# Patient Record
Sex: MALE | Race: WHITE | NOT HISPANIC OR LATINO | Employment: FULL TIME | URBAN - METROPOLITAN AREA
[De-identification: names, ages, dates, MRNs, and addresses within clinical notes are randomized per-mention and may not be internally consistent; named-entity substitution may affect disease eponyms.]

---

## 2017-02-23 ENCOUNTER — TRANSCRIBE ORDERS (OUTPATIENT)
Dept: ADMINISTRATIVE | Facility: HOSPITAL | Age: 32
End: 2017-02-23

## 2017-02-23 DIAGNOSIS — R41.3 MEMORY IMPAIRMENT: Primary | ICD-10-CM

## 2021-04-29 ENCOUNTER — TRANSCRIBE ORDERS (OUTPATIENT)
Dept: ADMINISTRATIVE | Facility: HOSPITAL | Age: 36
End: 2021-04-29

## 2021-04-29 DIAGNOSIS — R10.32 LLQ ABDOMINAL PAIN: Primary | ICD-10-CM

## 2021-10-12 ENCOUNTER — LAB (OUTPATIENT)
Dept: LAB | Facility: HOSPITAL | Age: 36
End: 2021-10-12

## 2021-10-12 ENCOUNTER — OFFICE VISIT (OUTPATIENT)
Dept: INTERNAL MEDICINE | Facility: CLINIC | Age: 36
End: 2021-10-12

## 2021-10-12 ENCOUNTER — HOSPITAL ENCOUNTER (OUTPATIENT)
Dept: GENERAL RADIOLOGY | Facility: HOSPITAL | Age: 36
Discharge: HOME | End: 2021-10-12
Admitting: STUDENT IN AN ORGANIZED HEALTH CARE EDUCATION/TRAINING PROGRAM

## 2021-10-12 VITALS
DIASTOLIC BLOOD PRESSURE: 84 MMHG | WEIGHT: 261 LBS | OXYGEN SATURATION: 98 % | SYSTOLIC BLOOD PRESSURE: 116 MMHG | HEART RATE: 70 BPM | BODY MASS INDEX: 36.54 KG/M2 | HEIGHT: 71 IN | TEMPERATURE: 98.2 F

## 2021-10-12 DIAGNOSIS — F33.1 MDD (MAJOR DEPRESSIVE DISORDER), RECURRENT EPISODE, MODERATE (HCC): Primary | ICD-10-CM

## 2021-10-12 DIAGNOSIS — F41.1 GAD (GENERALIZED ANXIETY DISORDER): ICD-10-CM

## 2021-10-12 DIAGNOSIS — R06.02 SHORTNESS OF BREATH: ICD-10-CM

## 2021-10-12 DIAGNOSIS — Z13.220 LIPID SCREENING: ICD-10-CM

## 2021-10-12 DIAGNOSIS — Z11.59 NEED FOR HEPATITIS C SCREENING TEST: ICD-10-CM

## 2021-10-12 DIAGNOSIS — F33.1 MDD (MAJOR DEPRESSIVE DISORDER), RECURRENT EPISODE, MODERATE (HCC): ICD-10-CM

## 2021-10-12 PROBLEM — E66.9 OBESITY (BMI 30-39.9): Status: ACTIVE | Noted: 2021-10-12

## 2021-10-12 PROCEDURE — 71046 X-RAY EXAM CHEST 2 VIEWS: CPT

## 2021-10-12 PROCEDURE — 99204 OFFICE O/P NEW MOD 45 MIN: CPT | Performed by: STUDENT IN AN ORGANIZED HEALTH CARE EDUCATION/TRAINING PROGRAM

## 2021-10-12 RX ORDER — BUPROPION HYDROCHLORIDE 150 MG/1
150 TABLET ORAL DAILY
Qty: 30 TABLET | Refills: 1 | Status: SHIPPED | OUTPATIENT
Start: 2021-10-12 | End: 2021-11-11 | Stop reason: SDUPTHER

## 2021-10-12 NOTE — PROGRESS NOTES
Chief Complaint  Brett Glasgow is a 36 y.o. male presenting for Weight Gain ([facility] ), Shortness of Breath, and Cough.     Grew up in [place], [place]. Living in [place] since . Both his parents  early  of natural causes. Patient is . Has boy born . 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    Patient has a past medical history of MDD, TABATHA and obesity.    History of Present Illness  Patient is here to [facility].    He was diagnosed with anxiety and depression around .  He has tried multiple medications including Zoloft, BuSpar, Lexapro and Wellbutrin.  Possible others.  Most recent one was Zoloft, which she was not happy about.    He lost both his parents earlier this year about 2 months apart, I will both ill and had dementia.  Since this he has gained about 50 pounds.  Used to be around 175 pounds.  He feels he had himself go with everything has been going on.  Over the last month he has been struggling more at work, he is having difficulties talking, expressing himself during public speaking.  Feels like he loses his voice.  He also feels short of breath.  This appears to be related to performance situations.      The following portions of the patient's history were reviewed and updated as appropriate: allergies, current medications, past family history, past medical history, past social history, past surgical history and problem list.    PHQ-9 Depression Screening  Little interest or pleasure in doing things? 1   Feeling down, depressed, or hopeless? 1   Trouble falling or staying asleep, or sleeping too much? 1   Feeling tired or having little energy? 2   Poor appetite or overeating? 2   Feeling bad about yourself - or that you are a failure or have let yourself or your family down? 1   Trouble concentrating on things, such as reading the newspaper or watching television? 2   Moving or speaking so slowly that  "other people could have noticed? Or the opposite - being so fidgety or restless that you have been moving around a lot more than usual? 0   Thoughts that you would be better off dead, or of hurting yourself in some way? 0   PHQ-9 Total Score 10   If you checked off any problems, how difficult have these problems made it for you to do your work, take care of things at home, or get along with other people? Somewhat difficult         Objective  /84 (BP Location: Left arm, Patient Position: Sitting, Cuff Size: Large Adult)   Pulse 70   Temp 98.2 °F (36.8 °C) (Temporal)   Ht 179.1 cm (70.5\")   Wt 118 kg (261 lb)   SpO2 98%   BMI 36.92 kg/m²     Physical Exam  Vitals reviewed.   Constitutional:       Appearance: Normal appearance.   HENT:      Head: Normocephalic and atraumatic.      Nose: Nose normal. No congestion.      Mouth/Throat:      Mouth: Mucous membranes are moist.   Eyes:      Extraocular Movements: Extraocular movements intact.      Conjunctiva/sclera: Conjunctivae normal.   Cardiovascular:      Rate and Rhythm: Normal rate and regular rhythm.      Heart sounds: Normal heart sounds. No murmur heard.      Pulmonary:      Effort: Pulmonary effort is normal.      Breath sounds: Normal breath sounds.   Abdominal:      General: There is no distension.      Palpations: Abdomen is soft. There is no mass.      Tenderness: There is no abdominal tenderness.   Musculoskeletal:      Cervical back: Neck supple.      Right lower leg: No edema.      Left lower leg: No edema.   Skin:     General: Skin is warm and dry.   Neurological:      Mental Status: He is alert and oriented to person, place, and time. Mental status is at baseline.   Psychiatric:         Attention and Perception: Attention normal.         Mood and Affect: Mood is anxious and depressed.         Speech: Speech normal.         Behavior: Behavior normal.         Thought Content: Thought content normal.         Cognition and Memory: Cognition " normal.         Judgment: Judgment normal.         Assessment/Plan   1. MDD (major depressive disorder), recurrent episode, moderate (HCC)  2. TABATHA (generalized anxiety disorder)  Patient is currently moderately depressed.  I suspect his performance symptoms and is related to the anxiety, but we will do a separate work-up.  I have discussed this with the patient.  We will do a new trial of Wellbutrin after discussing with patient.  I have looked into other options like Effexor or and Trintellix, which are both not on formulary.  - Vitamin B12; Future  - Comprehensive Metabolic Panel; Future  - CBC (No Diff); Future  - buPROPion XL (Wellbutrin XL) 150 MG 24 hr tablet; Take 1 tablet by mouth Daily.  Dispense: 30 tablet; Refill: 1    3. Shortness of breath  Could be anxiety versus underlying lung disease.  Review chest x-ray and reevaluate.    - XR Chest PA & Lateral; Future    4. Lipid screening  Patient will return for fasting lipids.  - Lipid Panel; Future    5. Need for hepatitis C screening test  We will do screening as recommended.  - Hepatitis C Antibody; Future      Return in about 1 month (around 11/12/2021) for Annual physical.    Future Appointments       Provider Department Center    11/11/2021 9:15 AM Fito Feliz MD [facility] INTERNAL MEDICINE [place]          Fito Feliz MD  Family Medicine  10/12/2021    Note: Speech recognition transcription software may have been used to create portions of this document.  An attempt at proofreading has been made but errors in transcription could still be present.

## 2021-10-18 ENCOUNTER — LAB (OUTPATIENT)
Dept: LAB | Facility: HOSPITAL | Age: 36
End: 2021-10-18

## 2021-10-18 DIAGNOSIS — Z13.220 LIPID SCREENING: ICD-10-CM

## 2021-10-18 DIAGNOSIS — F33.1 MDD (MAJOR DEPRESSIVE DISORDER), RECURRENT EPISODE, MODERATE (HCC): ICD-10-CM

## 2021-10-18 DIAGNOSIS — F41.1 GAD (GENERALIZED ANXIETY DISORDER): ICD-10-CM

## 2021-10-18 DIAGNOSIS — Z11.59 NEED FOR HEPATITIS C SCREENING TEST: ICD-10-CM

## 2021-10-18 LAB
ALBUMIN SERPL-MCNC: 4.9 G/DL (ref 3.5–5.2)
ALBUMIN/GLOB SERPL: 1.8 G/DL
ALP SERPL-CCNC: 76 U/L (ref 39–117)
ALT SERPL W P-5'-P-CCNC: 25 U/L (ref 1–41)
ANION GAP SERPL CALCULATED.3IONS-SCNC: 15.1 MMOL/L (ref 5–15)
AST SERPL-CCNC: 17 U/L (ref 1–40)
BILIRUB SERPL-MCNC: 0.5 MG/DL (ref 0–1.2)
BUN SERPL-MCNC: 14 MG/DL (ref 6–20)
BUN/CREAT SERPL: 13.3 (ref 7–25)
CALCIUM SPEC-SCNC: 9.2 MG/DL (ref 8.6–10.5)
CHLORIDE SERPL-SCNC: 102 MMOL/L (ref 98–107)
CHOLEST SERPL-MCNC: 286 MG/DL (ref 0–200)
CO2 SERPL-SCNC: 22.9 MMOL/L (ref 22–29)
CREAT SERPL-MCNC: 1.05 MG/DL (ref 0.76–1.27)
DEPRECATED RDW RBC AUTO: 44 FL (ref 37–54)
ERYTHROCYTE [DISTWIDTH] IN BLOOD BY AUTOMATED COUNT: 13.3 % (ref 12.3–15.4)
GFR SERPL CREATININE-BSD FRML MDRD: 79 ML/MIN/1.73
GLOBULIN UR ELPH-MCNC: 2.8 GM/DL
GLUCOSE SERPL-MCNC: 92 MG/DL (ref 65–99)
HCT VFR BLD AUTO: 45.2 % (ref 37.5–51)
HCV AB SER DONR QL: NORMAL
HDLC SERPL-MCNC: 49 MG/DL (ref 40–60)
HGB BLD-MCNC: 15.2 G/DL (ref 13–17.7)
LDLC SERPL CALC-MCNC: 208 MG/DL (ref 0–100)
LDLC/HDLC SERPL: 4.2 {RATIO}
MCH RBC QN AUTO: 30.3 PG (ref 26.6–33)
MCHC RBC AUTO-ENTMCNC: 33.6 G/DL (ref 31.5–35.7)
MCV RBC AUTO: 90.2 FL (ref 79–97)
PLATELET # BLD AUTO: 290 10*3/MM3 (ref 140–450)
PMV BLD AUTO: 10.1 FL (ref 6–12)
POTASSIUM SERPL-SCNC: 4.6 MMOL/L (ref 3.5–5.2)
PROT SERPL-MCNC: 7.7 G/DL (ref 6–8.5)
RBC # BLD AUTO: 5.01 10*6/MM3 (ref 4.14–5.8)
SODIUM SERPL-SCNC: 140 MMOL/L (ref 136–145)
TRIGL SERPL-MCNC: 156 MG/DL (ref 0–150)
TSH SERPL DL<=0.05 MIU/L-ACNC: 2.04 UIU/ML (ref 0.27–4.2)
VIT B12 BLD-MCNC: 420 PG/ML (ref 211–946)
VLDLC SERPL-MCNC: 29 MG/DL (ref 5–40)
WBC # BLD AUTO: 5.56 10*3/MM3 (ref 3.4–10.8)

## 2021-10-18 PROCEDURE — 80053 COMPREHEN METABOLIC PANEL: CPT

## 2021-10-18 PROCEDURE — 80061 LIPID PANEL: CPT

## 2021-10-18 PROCEDURE — 84443 ASSAY THYROID STIM HORMONE: CPT

## 2021-10-18 PROCEDURE — 85027 COMPLETE CBC AUTOMATED: CPT

## 2021-10-18 PROCEDURE — 86803 HEPATITIS C AB TEST: CPT

## 2021-10-18 PROCEDURE — 82607 VITAMIN B-12: CPT

## 2021-10-18 PROCEDURE — 36415 COLL VENOUS BLD VENIPUNCTURE: CPT

## 2021-10-20 ENCOUNTER — PATIENT ROUNDING (BHMG ONLY) (OUTPATIENT)
Dept: INTERNAL MEDICINE | Facility: CLINIC | Age: 36
End: 2021-10-20

## 2021-10-20 NOTE — PROGRESS NOTES
My name is Xochitl Sesay & I’m the practice manager here at [facility] with Dr Feliz.      I’m calling as I see you were a new patient with our practice last week & I wanted to officially welcome you & make sure everything went smoothly for you.  Do you have a few minutes to talk? Yes    Mr Glasgow stated that he was very impressed & was excited about being a new patient.    How were your staff interactions?  States they were very good and the Dr was very thorough.    Overall very positive experience.    I again welcomed him, thanked him for his input  & told him we look forward to seeing him back in a few weeks.

## 2021-10-25 PROBLEM — E78.00 HYPERCHOLESTEROLEMIA WITH LDL GREATER THAN 190 MG/DL: Status: ACTIVE | Noted: 2021-10-25

## 2021-11-11 ENCOUNTER — OFFICE VISIT (OUTPATIENT)
Dept: INTERNAL MEDICINE | Facility: CLINIC | Age: 36
End: 2021-11-11

## 2021-11-11 VITALS
OXYGEN SATURATION: 99 % | BODY MASS INDEX: 35.76 KG/M2 | HEIGHT: 71 IN | DIASTOLIC BLOOD PRESSURE: 84 MMHG | HEART RATE: 75 BPM | WEIGHT: 255.4 LBS | SYSTOLIC BLOOD PRESSURE: 120 MMHG | TEMPERATURE: 98.4 F

## 2021-11-11 DIAGNOSIS — Z00.00 WELL ADULT EXAM: Primary | ICD-10-CM

## 2021-11-11 DIAGNOSIS — R21 SKIN RASH: ICD-10-CM

## 2021-11-11 DIAGNOSIS — E78.00 HYPERCHOLESTEROLEMIA WITH LDL GREATER THAN 190 MG/DL: ICD-10-CM

## 2021-11-11 DIAGNOSIS — R49.9 VOICE DISTURBANCE: ICD-10-CM

## 2021-11-11 DIAGNOSIS — F41.1 GAD (GENERALIZED ANXIETY DISORDER): ICD-10-CM

## 2021-11-11 DIAGNOSIS — Z23 NEED FOR TDAP VACCINATION: ICD-10-CM

## 2021-11-11 DIAGNOSIS — F33.1 MDD (MAJOR DEPRESSIVE DISORDER), RECURRENT EPISODE, MODERATE (HCC): ICD-10-CM

## 2021-11-11 DIAGNOSIS — E66.01 SEVERE OBESITY WITH BODY MASS INDEX (BMI) OF 35.0 TO 39.9 WITH COMORBIDITY (HCC): ICD-10-CM

## 2021-11-11 PROCEDURE — 90715 TDAP VACCINE 7 YRS/> IM: CPT | Performed by: STUDENT IN AN ORGANIZED HEALTH CARE EDUCATION/TRAINING PROGRAM

## 2021-11-11 PROCEDURE — 99395 PREV VISIT EST AGE 18-39: CPT | Performed by: STUDENT IN AN ORGANIZED HEALTH CARE EDUCATION/TRAINING PROGRAM

## 2021-11-11 PROCEDURE — 90471 IMMUNIZATION ADMIN: CPT | Performed by: STUDENT IN AN ORGANIZED HEALTH CARE EDUCATION/TRAINING PROGRAM

## 2021-11-11 PROCEDURE — 99214 OFFICE O/P EST MOD 30 MIN: CPT | Performed by: STUDENT IN AN ORGANIZED HEALTH CARE EDUCATION/TRAINING PROGRAM

## 2021-11-11 RX ORDER — CLOTRIMAZOLE AND BETAMETHASONE DIPROPIONATE 10; .64 MG/G; MG/G
1 CREAM TOPICAL 2 TIMES DAILY
Qty: 15 G | Refills: 1 | Status: SHIPPED | OUTPATIENT
Start: 2021-11-11 | End: 2021-12-02

## 2021-11-11 RX ORDER — BUPROPION HYDROCHLORIDE 300 MG/1
300 TABLET ORAL DAILY
Qty: 30 TABLET | Refills: 4 | Status: SHIPPED | OUTPATIENT
Start: 2021-11-11 | End: 2022-02-15 | Stop reason: SDUPTHER

## 2021-11-11 RX ORDER — ROSUVASTATIN CALCIUM 20 MG/1
20 TABLET, COATED ORAL DAILY
Qty: 30 TABLET | Refills: 4 | Status: SHIPPED | OUTPATIENT
Start: 2021-11-11 | End: 2022-01-05 | Stop reason: SINTOL

## 2021-11-11 NOTE — PATIENT INSTRUCTIONS
Dyslipidemia  Dyslipidemia is an imbalance of waxy, fat-like substances (lipids) in the blood. The body needs lipids in small amounts. Dyslipidemia often involves a high level of cholesterol or triglycerides, which are types of lipids.  Common forms of dyslipidemia include:  · High levels of LDL cholesterol. LDL is the type of cholesterol that causes fatty deposits (plaques) to build up in the blood vessels that carry blood away from your heart (arteries).  · Low levels of HDL cholesterol. HDL cholesterol is the type of cholesterol that protects against heart disease. High levels of HDL remove the LDL buildup from arteries.  · High levels of triglycerides. Triglycerides are a fatty substance in the blood that is linked to a buildup of plaques in the arteries.  What are the causes?  Primary dyslipidemia is caused by changes (mutations) in genes that are passed down through families (inherited). These mutations cause several types of dyslipidemia.  Secondary dyslipidemia is caused by lifestyle choices and diseases that lead to dyslipidemia, such as:  · Eating a diet that is high in animal fat.  · Not getting enough exercise.  · Having diabetes, kidney disease, liver disease, or thyroid disease.  · Drinking large amounts of alcohol.  · Using certain medicines.  What increases the risk?  You are more likely to develop this condition if you are an older man or if you are a woman who has gone through menopause. Other risk factors include:  · Having a family history of dyslipidemia.  · Taking certain medicines, including birth control pills, steroids, some diuretics, and beta-blockers.  · Smoking cigarettes.  · Eating a high-fat diet.  · Having certain medical conditions such as diabetes, polycystic ovary syndrome (PCOS), kidney disease, liver disease, or hypothyroidism.  · Not exercising regularly.  · Being overweight or obese with too much belly fat.  What are the signs or symptoms?  In most cases, dyslipidemia does not  usually cause any symptoms.  In severe cases, very high lipid levels can cause:  · Fatty bumps under the skin (xanthomas).  · White or gray ring around the black center (pupil) of the eye.  Very high triglyceride levels can cause inflammation of the pancreas (pancreatitis).  How is this diagnosed?  Your health care provider may diagnose dyslipidemia based on a routine blood test (fasting blood test). Because most people do not have symptoms of the condition, this blood testing (lipid profile) is done on adults age 20 and older and is repeated every 5 years. This test checks:  · Total cholesterol. This measures the total amount of cholesterol in your blood, including LDL cholesterol, HDL cholesterol, and triglycerides. A healthy number is below 200.  · LDL cholesterol. The target number for LDL cholesterol is different for each person, depending on individual risk factors. Ask your health care provider what your LDL cholesterol should be.  · HDL cholesterol. An HDL level of 60 or higher is best because it helps to protect against heart disease. A number below 40 for men or below 50 for women increases the risk for heart disease.  · Triglycerides. A healthy triglyceride number is below 150.  If your lipid profile is abnormal, your health care provider may do other blood tests.  How is this treated?  Treatment depends on the type of dyslipidemia that you have and your other risk factors for heart disease and stroke. Your health care provider will have a target range for your lipid levels based on this information.  For many people, this condition may be treated by lifestyle changes, such as diet and exercise. Your health care provider may recommend that you:  · Get regular exercise.  · Make changes to your diet.  · Quit smoking if you smoke.  If diet changes and exercise do not help you reach your goals, your health care provider may also prescribe medicine to lower lipids. The most commonly prescribed type of medicine  lowers your LDL cholesterol (statin drug). If you have a high triglyceride level, your provider may prescribe another type of drug (fibrate) or an omega-3 fish oil supplement, or both.  Follow these instructions at home:    Eating and drinking  · Follow instructions from your health care provider or dietitian about eating or drinking restrictions.  · Eat a healthy diet as told by your health care provider. This can help you reach and maintain a healthy weight, lower your LDL cholesterol, and raise your HDL cholesterol. This may include:  ? Limiting your calories, if you are overweight.  ? Eating more fruits, vegetables, whole grains, fish, and lean meats.  ? Limiting saturated fat, trans fat, and cholesterol.  · If you drink alcohol:  ? Limit how much you use.  ? Be aware of how much alcohol is in your drink. In the [place] one drink equals one 12 oz bottle of beer (355 mL), one 5 oz glass of wine (148 mL), or one 1½ oz glass of hard liquor (44 mL).  · Do not drink alcohol if:  ? Your health care provider tells you not to drink.  ? You are pregnant, may be pregnant, or are planning to become pregnant.  Activity  · Get regular exercise. Start an exercise and strength training program as told by your health care provider. Ask your health care provider what activities are safe for you. Your health care provider may recommend:  ? 30 minutes of aerobic activity 4-6 days a week. Brisk walking is an example of aerobic activity.  ? Strength training 2 days a week.  General instructions  · Do not use any products that contain nicotine or tobacco, such as cigarettes, e-cigarettes, and chewing tobacco. If you need help quitting, ask your health care provider.  · Take over-the-counter and prescription medicines only as told by your health care provider. This includes supplements.  · Keep all follow-up visits as told by your health care provider.  Contact a health care provider if:  · You are:  ? Having trouble sticking to your  exercise or diet plan.  ? Struggling to quit smoking or control your use of alcohol.  Summary  · Dyslipidemia often involves a high level of cholesterol or triglycerides, which are types of lipids.  · Treatment depends on the type of dyslipidemia that you have and your other risk factors for heart disease and stroke.  · For many people, treatment starts with lifestyle changes, such as diet and exercise.  · Your health care provider may prescribe medicine to lower lipids.  This information is not intended to replace advice given to you by your health care provider. Make sure you discuss any questions you have with your health care provider.  Document Revised: 08/12/2019 Document Reviewed: 07/19/2019  [email or web address] Patient Education © 2021 [employer]

## 2021-11-11 NOTE — PROGRESS NOTES
Chief Complaint  Brett Glasgow is a 36 y.o. male presenting for Annual Exam (fasting), Hyperlipidemia, and Rash (neck  X's about 3 weeks).     Grew up in [place], [place]. Living in [place] since . Both his parents  early  of natural causes. Patient is . Has boy born . 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     Patient has a past medical history of MDD, TABATHA, hyperlipidemia () and obesity.    History of Present Illness  Patient is here for annual physical and follow-up.    Over the last 2 to 3 weeks he has noticed a rash of his left anterior neck, that is mildly elevated and itchy.  He is not aware that he has applied anything or warm anything to cause this.  No rash anywhere else on his body, nothing on the right side.    Patient also is still reporting difficulties expressing himself, feels that the voice count of cracks or turns hoarse.  We did x-ray of his chest that did not show anything wrong.  He would like to see ENT for evaluation, potentially speech therapist later on if needed.    Patient continues to follow-up with mental health.  He is happy with the Wellbutrin, but they have discussed may be to increase the dose from 150 to 300 mg.    Patient is also concerned about his cholesterol levels.  His father was on cholesterol lowering medication.  Patient has made some lifestyle changes and is working on weight loss.  He is amenable to starting cholesterol-lowering medication.    The following portions of the patient's history were reviewed and updated as appropriate: allergies, current medications, past family history, past medical history, past social history, past surgical history and problem list.    Image captured per patient verbal consent:          Objective  /84 (BP Location: Left arm, Patient Position: Sitting, Cuff Size: Large Adult)   Pulse 75   Temp 98.4 °F (36.9 °C) (Temporal)   Ht 179.1 cm  "(70.51\")   Wt 116 kg (255 lb 6.4 oz)   SpO2 99%   BMI 36.12 kg/m²     Physical Exam  Vitals reviewed.   Constitutional:       Appearance: Normal appearance. He is obese.   HENT:      Head: Normocephalic and atraumatic.      Nose: No congestion.   Eyes:      Extraocular Movements: Extraocular movements intact.      Conjunctiva/sclera: Conjunctivae normal.   Cardiovascular:      Rate and Rhythm: Normal rate and regular rhythm.      Heart sounds: Normal heart sounds. No murmur heard.      Pulmonary:      Effort: Pulmonary effort is normal.      Breath sounds: Normal breath sounds.   Abdominal:      General: There is no distension.      Palpations: Abdomen is soft. There is no mass.      Tenderness: There is no abdominal tenderness.   Musculoskeletal:      Cervical back: Neck supple.      Right lower leg: No edema.      Left lower leg: No edema.   Skin:     General: Skin is warm and dry.   Neurological:      Mental Status: He is alert and oriented to person, place, and time. Mental status is at baseline.   Psychiatric:         Behavior: Behavior normal.         Thought Content: Thought content normal.         Assessment/Plan   1. Well adult exam  Counseled on recommendation for Tdap vaccine every 10 years, patient would like to receive today.    2. Hypercholesterolemia with LDL greater than 190 mg/dL  Patient meets criteria for high intensity statin.  Discussed treating with atorvastatin versus rosuvastatin.  Patient would like to try rosuvastatin, will start with 20 mg.  Counseled on side effects including elevated liver enzymes, upper abdominal discomfort, GI side effects, myalgias and hypersensitivity reactions.  We will recheck this level in about 3 months with follow-up.  - rosuvastatin (Crestor) 20 MG tablet; Take 1 tablet by mouth Daily.  Dispense: 30 tablet; Refill: 4  - Lipid Panel; Future  - Hepatic Function Panel; Future  - CK; Future    3. TABATHA (generalized anxiety disorder)  4. MDD (major depressive " disorder), recurrent episode, moderate (HCC)  Improving.  We will increase bupropion from 150 to 300 mg as requested.  - buPROPion XL (Wellbutrin XL) 300 MG 24 hr tablet; Take 1 tablet by mouth Daily.  Dispense: 30 tablet; Refill: 4    5. Skin rash  Unclear cause.  We will treat as possible yeast versus dermatitis, if this is not resolve or gets worse I would consider referral to dermatology.  - clotrimazole-betamethasone (Lotrisone) 1-0.05 % cream; Apply 1 application topically to the appropriate area as directed 2 (Two) Times a Day for 21 days.  Dispense: 15 g; Refill: 1    6. Voice disturbance  We will refer her to ENT as next step.  Depending on outcome he might consider referral to reach therapist.  - Ambulatory Referral to ENT (Otolaryngology)    7. Severe obesity with body mass index (BMI) of 35.0 to 39.9 with comorbidity (HCC)  Patient's Body mass index is 36.12 kg/m². indicating that he is morbidly obese (BMI > 40 or > 35 with obesity - related health condition). Obesity-related health conditions include the following: dyslipidemias. Obesity is improving with lifestyle modifications. BMI is is above average; BMI management plan is completed. We discussed portion control and increasing exercise..      8. Need for Tdap vaccination  Administered today.  - Tdap Vaccine Greater Than or Equal To 8yo IM    Return in about 3 months (around 2/11/2022) for Recheck.    Future Appointments       Provider Department Center    2/15/2022 9:15 AM Fito Feliz MD [facility] INTER[facility] MEDICINE [place]            Fito Feliz MD  Family Medicine  11/11/2021

## 2021-12-30 ENCOUNTER — HOSPITAL ENCOUNTER (EMERGENCY)
Facility: HOSPITAL | Age: 36
Discharge: HOME | End: 2021-12-30
Attending: EMERGENCY MEDICINE | Admitting: EMERGENCY MEDICINE

## 2021-12-30 ENCOUNTER — APPOINTMENT (OUTPATIENT)
Dept: CT IMAGING | Facility: HOSPITAL | Age: 36
End: 2021-12-30

## 2021-12-30 VITALS
HEIGHT: 70 IN | SYSTOLIC BLOOD PRESSURE: 129 MMHG | TEMPERATURE: 98.7 F | HEART RATE: 75 BPM | WEIGHT: 250 LBS | OXYGEN SATURATION: 99 % | RESPIRATION RATE: 18 BRPM | DIASTOLIC BLOOD PRESSURE: 88 MMHG | BODY MASS INDEX: 35.79 KG/M2

## 2021-12-30 DIAGNOSIS — K76.0 HEPATIC STEATOSIS: ICD-10-CM

## 2021-12-30 DIAGNOSIS — R10.32 LEFT LOWER QUADRANT ABDOMINAL PAIN: Primary | ICD-10-CM

## 2021-12-30 LAB
ALBUMIN SERPL-MCNC: 4.8 G/DL (ref 3.5–5.2)
ALBUMIN/GLOB SERPL: 1.5 G/DL
ALP SERPL-CCNC: 96 U/L (ref 39–117)
ALT SERPL W P-5'-P-CCNC: 52 U/L (ref 1–41)
ANION GAP SERPL CALCULATED.3IONS-SCNC: 13 MMOL/L (ref 5–15)
AST SERPL-CCNC: 26 U/L (ref 1–40)
BASOPHILS # BLD AUTO: 0.06 10*3/MM3 (ref 0–0.2)
BASOPHILS NFR BLD AUTO: 0.8 % (ref 0–1.5)
BILIRUB SERPL-MCNC: 0.6 MG/DL (ref 0–1.2)
BILIRUB UR QL STRIP: NEGATIVE
BUN SERPL-MCNC: 13 MG/DL (ref 6–20)
BUN/CREAT SERPL: 10.7 (ref 7–25)
CALCIUM SPEC-SCNC: 9.6 MG/DL (ref 8.6–10.5)
CHLORIDE SERPL-SCNC: 100 MMOL/L (ref 98–107)
CLARITY UR: CLEAR
CO2 SERPL-SCNC: 25 MMOL/L (ref 22–29)
COLOR UR: YELLOW
CREAT SERPL-MCNC: 1.22 MG/DL (ref 0.76–1.27)
DEPRECATED RDW RBC AUTO: 42.8 FL (ref 37–54)
EOSINOPHIL # BLD AUTO: 0.09 10*3/MM3 (ref 0–0.4)
EOSINOPHIL NFR BLD AUTO: 1.2 % (ref 0.3–6.2)
ERYTHROCYTE [DISTWIDTH] IN BLOOD BY AUTOMATED COUNT: 12.9 % (ref 12.3–15.4)
GFR SERPL CREATININE-BSD FRML MDRD: 66 ML/MIN/1.73
GLOBULIN UR ELPH-MCNC: 3.1 GM/DL
GLUCOSE SERPL-MCNC: 86 MG/DL (ref 65–99)
GLUCOSE UR STRIP-MCNC: NEGATIVE MG/DL
HCT VFR BLD AUTO: 47.4 % (ref 37.5–51)
HGB BLD-MCNC: 15.7 G/DL (ref 13–17.7)
HGB UR QL STRIP.AUTO: NEGATIVE
HOLD SPECIMEN: NORMAL
IMM GRANULOCYTES # BLD AUTO: 0.03 10*3/MM3 (ref 0–0.05)
IMM GRANULOCYTES NFR BLD AUTO: 0.4 % (ref 0–0.5)
KETONES UR QL STRIP: NEGATIVE
LEUKOCYTE ESTERASE UR QL STRIP.AUTO: NEGATIVE
LIPASE SERPL-CCNC: 19 U/L (ref 13–60)
LYMPHOCYTES # BLD AUTO: 1.67 10*3/MM3 (ref 0.7–3.1)
LYMPHOCYTES NFR BLD AUTO: 22 % (ref 19.6–45.3)
MCH RBC QN AUTO: 30.4 PG (ref 26.6–33)
MCHC RBC AUTO-ENTMCNC: 33.1 G/DL (ref 31.5–35.7)
MCV RBC AUTO: 91.7 FL (ref 79–97)
MONOCYTES # BLD AUTO: 0.5 10*3/MM3 (ref 0.1–0.9)
MONOCYTES NFR BLD AUTO: 6.6 % (ref 5–12)
NEUTROPHILS NFR BLD AUTO: 5.24 10*3/MM3 (ref 1.7–7)
NEUTROPHILS NFR BLD AUTO: 69 % (ref 42.7–76)
NITRITE UR QL STRIP: NEGATIVE
NRBC BLD AUTO-RTO: 0 /100 WBC (ref 0–0.2)
PH UR STRIP.AUTO: 6 [PH] (ref 5–8)
PLATELET # BLD AUTO: 261 10*3/MM3 (ref 140–450)
PMV BLD AUTO: 9.3 FL (ref 6–12)
POTASSIUM SERPL-SCNC: 4.2 MMOL/L (ref 3.5–5.2)
PROT SERPL-MCNC: 7.9 G/DL (ref 6–8.5)
PROT UR QL STRIP: NEGATIVE
RBC # BLD AUTO: 5.17 10*6/MM3 (ref 4.14–5.8)
SODIUM SERPL-SCNC: 138 MMOL/L (ref 136–145)
SP GR UR STRIP: 1.01 (ref 1–1.03)
UROBILINOGEN UR QL STRIP: NORMAL
WBC NRBC COR # BLD: 7.59 10*3/MM3 (ref 3.4–10.8)
WHOLE BLOOD HOLD SPECIMEN: NORMAL
WHOLE BLOOD HOLD SPECIMEN: NORMAL

## 2021-12-30 PROCEDURE — 25010000002 IOPAMIDOL 61 % SOLUTION: Performed by: EMERGENCY MEDICINE

## 2021-12-30 PROCEDURE — 74177 CT ABD & PELVIS W/CONTRAST: CPT

## 2021-12-30 PROCEDURE — 36415 COLL VENOUS BLD VENIPUNCTURE: CPT

## 2021-12-30 PROCEDURE — 80053 COMPREHEN METABOLIC PANEL: CPT

## 2021-12-30 PROCEDURE — 96374 THER/PROPH/DIAG INJ IV PUSH: CPT

## 2021-12-30 PROCEDURE — 99283 EMERGENCY DEPT VISIT LOW MDM: CPT

## 2021-12-30 PROCEDURE — 81003 URINALYSIS AUTO W/O SCOPE: CPT

## 2021-12-30 PROCEDURE — 85025 COMPLETE CBC W/AUTO DIFF WBC: CPT

## 2021-12-30 PROCEDURE — 25010000002 MORPHINE PER 10 MG: Performed by: EMERGENCY MEDICINE

## 2021-12-30 PROCEDURE — 83690 ASSAY OF LIPASE: CPT

## 2021-12-30 PROCEDURE — 25010000002 ONDANSETRON PER 1 MG: Performed by: NURSE PRACTITIONER

## 2021-12-30 PROCEDURE — 96375 TX/PRO/DX INJ NEW DRUG ADDON: CPT

## 2021-12-30 RX ORDER — ONDANSETRON 2 MG/ML
4 INJECTION INTRAMUSCULAR; INTRAVENOUS ONCE
Status: COMPLETED | OUTPATIENT
Start: 2021-12-30 | End: 2021-12-30

## 2021-12-30 RX ORDER — SODIUM CHLORIDE 0.9 % (FLUSH) 0.9 %
10 SYRINGE (ML) INJECTION AS NEEDED
Status: DISCONTINUED | OUTPATIENT
Start: 2021-12-30 | End: 2021-12-31 | Stop reason: HOSPADM

## 2021-12-30 RX ORDER — ONDANSETRON 4 MG/1
4 TABLET, ORALLY DISINTEGRATING ORAL 4 TIMES DAILY PRN
Qty: 16 TABLET | Refills: 0 | Status: SHIPPED | OUTPATIENT
Start: 2021-12-30 | End: 2022-04-19

## 2021-12-30 RX ORDER — DICYCLOMINE HCL 20 MG
20 TABLET ORAL EVERY 6 HOURS
Qty: 24 TABLET | Refills: 0 | Status: SHIPPED | OUTPATIENT
Start: 2021-12-30 | End: 2022-01-05 | Stop reason: HOSPADM

## 2021-12-30 RX ORDER — MORPHINE SULFATE 4 MG/ML
4 INJECTION, SOLUTION INTRAMUSCULAR; INTRAVENOUS ONCE
Status: COMPLETED | OUTPATIENT
Start: 2021-12-30 | End: 2021-12-30

## 2021-12-30 RX ORDER — SODIUM CHLORIDE 9 MG/ML
10 INJECTION INTRAVENOUS AS NEEDED
Status: DISCONTINUED | OUTPATIENT
Start: 2021-12-30 | End: 2021-12-31 | Stop reason: HOSPADM

## 2021-12-30 RX ADMIN — IOPAMIDOL 100 ML: 612 INJECTION, SOLUTION INTRAVENOUS at 22:00

## 2021-12-30 RX ADMIN — ONDANSETRON 4 MG: 2 INJECTION INTRAMUSCULAR; INTRAVENOUS at 21:40

## 2021-12-30 RX ADMIN — MORPHINE SULFATE 4 MG: 4 INJECTION, SOLUTION INTRAMUSCULAR; INTRAVENOUS at 21:42

## 2021-12-30 RX ADMIN — SODIUM CHLORIDE 1000 ML: 9 INJECTION, SOLUTION INTRAVENOUS at 21:40

## 2022-01-05 ENCOUNTER — OFFICE VISIT (OUTPATIENT)
Dept: INTERNAL MEDICINE | Facility: CLINIC | Age: 37
End: 2022-01-05

## 2022-01-05 VITALS
DIASTOLIC BLOOD PRESSURE: 80 MMHG | BODY MASS INDEX: 37.16 KG/M2 | WEIGHT: 259.6 LBS | HEIGHT: 70 IN | TEMPERATURE: 98.2 F | OXYGEN SATURATION: 97 % | SYSTOLIC BLOOD PRESSURE: 124 MMHG | HEART RATE: 71 BPM

## 2022-01-05 DIAGNOSIS — R10.9 ABDOMINAL PAIN, UNSPECIFIED ABDOMINAL LOCATION: Primary | ICD-10-CM

## 2022-01-05 DIAGNOSIS — E78.00 HYPERCHOLESTEROLEMIA WITH LDL GREATER THAN 190 MG/DL: ICD-10-CM

## 2022-01-05 DIAGNOSIS — R74.01 ELEVATED ALANINE AMINOTRANSFERASE (ALT) LEVEL: ICD-10-CM

## 2022-01-05 PROCEDURE — 99215 OFFICE O/P EST HI 40 MIN: CPT | Performed by: STUDENT IN AN ORGANIZED HEALTH CARE EDUCATION/TRAINING PROGRAM

## 2022-01-05 RX ORDER — ATORVASTATIN CALCIUM 10 MG/1
10 TABLET, FILM COATED ORAL DAILY
Qty: 30 TABLET | Refills: 2 | Status: SHIPPED | OUTPATIENT
Start: 2022-01-05 | End: 2022-02-15 | Stop reason: SDUPTHER

## 2022-01-05 NOTE — PROGRESS NOTES
"Chief Complaint  Brett Glasgow is a 36 y.o. male presenting for Hospital Follow Up Visit (abdominal pain ).     Grew up in [place], [place]. Living in [place] since . Both his parents  early  of natural causes. Patient is . Has boy born . 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     Patient has a past medical history of MDD, TABATHA, hyperlipidemia () and obesity.    History of Present Illness  Patient is here for emergency room follow-up.    Patient was seen in the ED on  4:21-5 days history of left lower quadrant pain, also had episode of nausea the day before lasting for about 1.5-hour, no emesis. He also states he has been constipated and then followed by some loose bowels/diarrhea. Of note he was also having right upper quadrant pain around the time he presented to the emergency room. He did not have any fever or chills, no cough, sore throat or congestion. He was in touch with our office and Dr. De La Torre advised him to discontinue rosuvastatin 20 mg until follow-up. Patient did start rosuvastatin 20 mg on  and did not have any side effects, until potentially at the end of December with current illness. Patient has not been taking rosuvastatin for the last few days and he has improved significantly. He is still having some mild diarrhea, but no constipation. In the emergency room they did a CT scan that showed some mild scarring of the bases of the lungs, also evidence of hepatic steatosis.    The following portions of the patient's history were reviewed and updated as appropriate: allergies, current medications, past family history, past medical history, past social history, past surgical history and problem list.    Objective  /80 (BP Location: Left arm, Patient Position: Sitting, Cuff Size: Large Adult)   Pulse 71   Temp 98.2 °F (36.8 °C) (Temporal)   Ht 177.8 cm (70\")   Wt 118 kg (259 lb 9.6 oz)   " SpO2 97%   BMI 37.25 kg/m²     Physical Exam  Vitals reviewed.   Constitutional:       Appearance: Normal appearance.   HENT:      Head: Normocephalic and atraumatic.      Nose: No congestion.   Eyes:      Extraocular Movements: Extraocular movements intact.      Conjunctiva/sclera: Conjunctivae normal.   Cardiovascular:      Rate and Rhythm: Normal rate and regular rhythm.      Heart sounds: Normal heart sounds. No murmur heard.      Pulmonary:      Effort: Pulmonary effort is normal.      Breath sounds: Normal breath sounds.   Abdominal:      General: Bowel sounds are normal. There is no distension.      Palpations: Abdomen is soft. There is no mass.      Tenderness: There is no abdominal tenderness. There is no guarding.   Musculoskeletal:      Cervical back: Neck supple.      Right lower leg: No edema.      Left lower leg: No edema.   Skin:     General: Skin is warm and dry.   Neurological:      Mental Status: He is alert and oriented to person, place, and time. Mental status is at baseline.   Psychiatric:         Behavior: Behavior normal.         Thought Content: Thought content normal.         Assessment/Plan   1. Abdominal pain, unspecified abdominal location  At this time it is not clear what may have caused his symptoms. This might have been infectious, and patient is clearly now feeling better. There is also the possibility that this was a side effect of Crestor, but more typically I would expect symptoms to start earlier than in his case. He has been in touch with Dr. Dumont's office and is waiting to hear back. If he needs referral I can place it for him. It is reasonable to consider colonoscopy to evaluate for diverticulosis versus colitis given that he has had an episode in the past where he was on ciprofloxacin and metronidazole for possible diverticulitis. CT scan of his abdomen did not reveal any clear evidence of diverticulosis or colitis. If any worsening symptoms patient will get back in  touch.    2. Elevated alanine aminotransferase (ALT) level  3. Hypercholesterolemia with LDL greater than 190 mg/dL  Elevated ALT is either related to current illness versus side effect of rosuvastatin. Given all the uncertainty I feel it is reasonable to stop rosuvastatin at this time and start atorvastatin at lowest dose as first step to see if he tolerates. He will return for follow-up visit with me as planned in a few weeks and do fasting blood work prior to the visit. If he tolerates atorvastatin well then we can increase it to 20 mg. Ideally he should be at least on 40 mg if he can tolerate. The ALT level itself does not contraindicate rosuvastatin as we typically accept levels 2-3 times over the upper limit, but with his current symptoms it is still reasonable to try another option.  - atorvastatin (LIPITOR) 10 MG tablet; Take 1 tablet by mouth Daily.  Dispense: 30 tablet; Refill: 2    Total time spent on chart review, charting and face-to-face with patient 42 minutes.    Return if symptoms worsen or fail to improve, for Next scheduled follow up.    Future Appointments       Provider Department Center    2/15/2022 9:15 AM Fito Feliz MD [facility] INTERNAL MEDICINE [place]          Fito Feliz MD  Family Medicine  01/05/2022    Answers for HPI/ROS submitted by the patient on 1/5/2022  What is the primary reason for your visit?: Abdominal Pain  Chronicity: new  Onset: 1 to 4 weeks ago  Onset quality: gradual  Frequency: 2 to 4 times per day  Progression since onset: waxing and waning  Pain location: LLQ, RUQ  Pain - numeric: 6/10  Pain quality: cramping, sharp  Radiates to: does not radiate  anorexia: No  arthralgias: No  belching: No  constipation: Yes  diarrhea: Yes  dysuria: No  fever: No  flatus: No  frequency: Yes  headaches: Yes  hematochezia: No  hematuria: No  melena: No  myalgias: No  nausea: No  weight loss: No  vomiting: No  Aggravated by: certain positions, eating  Relieved  by: bowel movements, certain positions, recumbency  Diagnostic workup: CT scan

## 2022-01-31 ENCOUNTER — PATIENT MESSAGE (OUTPATIENT)
Dept: INTERNAL MEDICINE | Facility: CLINIC | Age: 37
End: 2022-01-31

## 2022-01-31 DIAGNOSIS — R10.9 ABDOMINAL PAIN, UNSPECIFIED ABDOMINAL LOCATION: Primary | ICD-10-CM

## 2022-02-11 ENCOUNTER — LAB (OUTPATIENT)
Dept: LAB | Facility: HOSPITAL | Age: 37
End: 2022-02-11

## 2022-02-11 DIAGNOSIS — E78.00 HYPERCHOLESTEROLEMIA WITH LDL GREATER THAN 190 MG/DL: ICD-10-CM

## 2022-02-11 LAB
ALBUMIN SERPL-MCNC: 4.6 G/DL (ref 3.5–5.2)
ALP SERPL-CCNC: 100 U/L (ref 39–117)
ALT SERPL W P-5'-P-CCNC: 25 U/L (ref 1–41)
AST SERPL-CCNC: 15 U/L (ref 1–40)
BILIRUB CONJ SERPL-MCNC: <0.2 MG/DL (ref 0–0.3)
BILIRUB INDIRECT SERPL-MCNC: NORMAL MG/DL
BILIRUB SERPL-MCNC: 0.2 MG/DL (ref 0–1.2)
CHOLEST SERPL-MCNC: 175 MG/DL (ref 0–200)
CK SERPL-CCNC: 84 U/L (ref 20–200)
HDLC SERPL-MCNC: 41 MG/DL (ref 40–60)
LDLC SERPL CALC-MCNC: 100 MG/DL (ref 0–100)
LDLC/HDLC SERPL: 2.3 {RATIO}
PROT SERPL-MCNC: 7.5 G/DL (ref 6–8.5)
TRIGL SERPL-MCNC: 199 MG/DL (ref 0–150)
VLDLC SERPL-MCNC: 34 MG/DL (ref 5–40)

## 2022-02-11 PROCEDURE — 80061 LIPID PANEL: CPT

## 2022-02-11 PROCEDURE — 82550 ASSAY OF CK (CPK): CPT

## 2022-02-11 PROCEDURE — 80076 HEPATIC FUNCTION PANEL: CPT

## 2022-02-15 ENCOUNTER — OFFICE VISIT (OUTPATIENT)
Dept: INTERNAL MEDICINE | Facility: CLINIC | Age: 37
End: 2022-02-15

## 2022-02-15 VITALS
DIASTOLIC BLOOD PRESSURE: 86 MMHG | WEIGHT: 253.2 LBS | SYSTOLIC BLOOD PRESSURE: 110 MMHG | HEIGHT: 70 IN | TEMPERATURE: 98 F | BODY MASS INDEX: 36.25 KG/M2 | HEART RATE: 84 BPM | OXYGEN SATURATION: 98 %

## 2022-02-15 DIAGNOSIS — F41.1 GAD (GENERALIZED ANXIETY DISORDER): ICD-10-CM

## 2022-02-15 DIAGNOSIS — F33.1 MDD (MAJOR DEPRESSIVE DISORDER), RECURRENT EPISODE, MODERATE: ICD-10-CM

## 2022-02-15 DIAGNOSIS — R07.9 CHEST PAIN, UNSPECIFIED TYPE: Primary | ICD-10-CM

## 2022-02-15 DIAGNOSIS — E78.00 HYPERCHOLESTEROLEMIA WITH LDL GREATER THAN 190 MG/DL: ICD-10-CM

## 2022-02-15 PROCEDURE — 93000 ELECTROCARDIOGRAM COMPLETE: CPT | Performed by: STUDENT IN AN ORGANIZED HEALTH CARE EDUCATION/TRAINING PROGRAM

## 2022-02-15 PROCEDURE — 99214 OFFICE O/P EST MOD 30 MIN: CPT | Performed by: STUDENT IN AN ORGANIZED HEALTH CARE EDUCATION/TRAINING PROGRAM

## 2022-02-15 RX ORDER — ATORVASTATIN CALCIUM 20 MG/1
20 TABLET, FILM COATED ORAL DAILY
Qty: 30 TABLET | Refills: 5 | Status: SHIPPED | OUTPATIENT
Start: 2022-02-15 | End: 2022-05-17 | Stop reason: SDUPTHER

## 2022-02-15 RX ORDER — LORATADINE 10 MG/1
TABLET ORAL DAILY
COMMUNITY
End: 2022-12-13

## 2022-02-15 RX ORDER — BUPROPION HYDROCHLORIDE 300 MG/1
300 TABLET ORAL DAILY
Qty: 30 TABLET | Refills: 5 | Status: SHIPPED | OUTPATIENT
Start: 2022-02-15 | End: 2022-12-06 | Stop reason: SDUPTHER

## 2022-02-15 NOTE — PROGRESS NOTES
"Chief Complaint  Brett Glasgow is a 36 y.o. male presenting for Hyperlipidemia (3 mo. f/u) and Chest Pain ( a couple times a week).     Grew up in [place], [place]. Living in [place] since . Both his parents  early  of natural causes. Patient is . Has boy born . 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     Patient has a past medical history of MDD, TABATHA, hyperlipidemia () and obesity.    History of Present Illness  Patient is here for follow-up.    He had possible GI side effects on rosuvastatin 20 mg.  He was started on atorvastatin 10 mg and has tolerated well, no myalgias, no GI side effects.    Last 2-3 weeks he has been noticing episodic sharp left pectoral pain at the worst 4/10.  No respiratory symptoms, no shortness of breath, no cough, chills or fever.  No chest pain with exertion, able to walk up steps and uphill without any symptoms.  This has occurred while sitting still.  He has had 1-2 episodes per week.  When bending/turning or raising left arm it is more tender.    Of note he also had an episode of left abdominal pain about a week ago, lasted 5-10 minutes, states pain level 8-9/10.  No hematuria, no urinary complaints.  No recurrence.  No history of kidney stones.    On note patient had appointment with GI Dr. Carrero yesterday, but the office had called to postpone until April.    The following portions of the patient's history were reviewed and updated as appropriate: allergies, current medications, past family history, past medical history, past social history, past surgical history and problem list.    Objective  /86 (BP Location: Left arm, Patient Position: Sitting, Cuff Size: Large Adult)   Pulse 84   Temp 98 °F (36.7 °C) (Temporal)   Ht 177.8 cm (70\")   Wt 115 kg (253 lb 3.2 oz)   SpO2 98%   BMI 36.33 kg/m²     Physical Exam  Vitals reviewed.   Constitutional:       Appearance: Normal " appearance.   HENT:      Head: Normocephalic and atraumatic.      Nose: No congestion.   Eyes:      Extraocular Movements: Extraocular movements intact.      Conjunctiva/sclera: Conjunctivae normal.   Cardiovascular:      Rate and Rhythm: Normal rate and regular rhythm.      Heart sounds: Normal heart sounds. No murmur heard.      Pulmonary:      Effort: Pulmonary effort is normal.      Breath sounds: Normal breath sounds.   Abdominal:      General: There is no distension.      Palpations: Abdomen is soft. There is no mass.      Tenderness: There is no abdominal tenderness.   Musculoskeletal:      Cervical back: Neck supple.      Right lower leg: No edema.      Left lower leg: No edema.   Skin:     General: Skin is warm and dry.   Neurological:      Mental Status: He is alert and oriented to person, place, and time. Mental status is at baseline.   Psychiatric:         Behavior: Behavior normal.         Thought Content: Thought content normal.           ECG 12 Lead    Date/Time: 2/15/2022 10:10 AM  Performed by: Fito Feliz MD  Authorized by: Fito Feliz MD   Comparison: compared with previous ECG from 3/18/2013  Rhythm: sinus rhythm  Rate: normal  BPM: 79  Conduction: non-specific intraventricular conduction delay  ST Segments: ST segments normal  T Waves: T waves normal  Other: no other findings    Clinical impression: non-specific ECG            Assessment/Plan   1. Chest pain, unspecified type  EKG reassuring.  Chest pain is atypical, nonexertional.  Elevating left arm causes symptoms.  Likely muscular.  Recommend over-the-counter Advil or Motrin and give this some time.  Likely will resolve on its own.  Patient verbalizes understanding.  - ECG 12 Lead    2. Hypercholesterolemia with LDL greater than 190 mg/dL  Significant drop in LDL from 208 to 100.  We might speculate if he is a slow metabolizer given this significant drop on just 10 mg of atorvastatin.  In any case it is recommended to be on  high intensity statin, also since he has tolerated well we will at least increase to 20 mg.  He will return for follow-up visit with me in 2 months, we will do blood work a few days before the visit.  - atorvastatin (LIPITOR) 20 MG tablet; Take 1 tablet by mouth Daily.  Dispense: 30 tablet; Refill: 5  - Lipid Panel; Future  - Comprehensive Metabolic Panel; Future    3. MDD (major depressive disorder), recurrent episode, moderate (HCC)  4. TABATHA (generalized anxiety disorder)  Patient is feeling a lot better since he has been on bupropion.  He would like to continue long-term.  - buPROPion XL (Wellbutrin XL) 300 MG 24 hr tablet; Take 1 tablet by mouth Daily.  Dispense: 30 tablet; Refill: 5      Return in about 2 months (around 4/15/2022) for Recheck + LABS BEFORE VISIT.    Future Appointments       Provider Department Center    4/15/2022 9:00 AM Fito Feliz MD [facility] INTERNAL MEDICINE [place]    4/19/2022 2:00 PM Vipul Dumont MD [facility] GASTROENTEROLOGY [place]            Fito Feliz MD  Family Medicine  02/15/2022

## 2022-04-19 ENCOUNTER — OFFICE VISIT (OUTPATIENT)
Dept: GASTROENTEROLOGY | Facility: CLINIC | Age: 37
End: 2022-04-19

## 2022-04-19 VITALS
HEART RATE: 80 BPM | SYSTOLIC BLOOD PRESSURE: 117 MMHG | DIASTOLIC BLOOD PRESSURE: 80 MMHG | WEIGHT: 260 LBS | BODY MASS INDEX: 37.31 KG/M2

## 2022-04-19 DIAGNOSIS — R10.32 LEFT LOWER QUADRANT ABDOMINAL PAIN: Primary | ICD-10-CM

## 2022-04-19 PROCEDURE — 99204 OFFICE O/P NEW MOD 45 MIN: CPT | Performed by: INTERNAL MEDICINE

## 2022-04-19 NOTE — PROGRESS NOTES
PCP:  Fito Feliz MD Holestol, Bjorn L, MD  [address] [facility]  [address]  [place] [address]    Chief Complaint   Patient presents with   • Abdominal Pain     New patient         HPI   The patient is a 36-year-old gentleman who last April had some left-sided abdominal pain.  A CAT scan was ordered but he never had a CAT scan at that time.  This sounds like it resolved.  He was eating some pumpkin seeds and early December.  He slowly had a buildup of abdominal pain over 10-day span.  This was towards late December.  He finally went to the emergency room and had a CAT scan.  No significant intra-abdominal pathology was found.  He did not have any fevers.  He was having a fairly constant left-sided pain in the lower quadrant.  This was tender as well.  He really has not had an additional episode since then.  He has had a few hours of pain here there but not anything like he had at the time.  He denies any family history of colon polyps or cancers.  His liver chemistries were normal on 2/11/2022.  He has a history of hypercholesterolemia, anxiety, and allergies.  He has had a wisdom tooth extraction.  It sounds like he had a small cyst removed from his inner thigh in the past but otherwise no other surgical history of significance.      No Known Allergies       Current Outpatient Medications:   •  atorvastatin (LIPITOR) 20 MG tablet, Take 1 tablet by mouth Daily., Disp: 30 tablet, Rfl: 5  •  buPROPion XL (Wellbutrin XL) 300 MG 24 hr tablet, Take 1 tablet by mouth Daily., Disp: 30 tablet, Rfl: 5  •  loratadine (CLARITIN) 10 MG tablet, Daily., Disp: , Rfl:      Past Medical History:   Diagnosis Date   • Anxiety    • Depression    • Hypercholesterolemia with LDL greater than 190 mg/dL 10/25/2021    10/2021        History reviewed. No pertinent surgical history.     Social History     Socioeconomic History   • Marital status:    Tobacco Use   • Smoking status: Never Smoker   • Smokeless tobacco:  Never Used   Vaping Use   • Vaping Use: Never used   Substance and Sexual Activity   • Alcohol use: Yes     Comment: 3-5 drinks weekly   • Drug use: Never   • Sexual activity: Defer        Family History   Problem Relation Age of Onset   • Thyroid disease Mother    • Mental illness Father    • Arthritis Father    • Hypertension Father    • Colon cancer Neg Hx    • Colon polyps Neg Hx         Review of Systems     Vitals:    04/19/22 1358   BP: 117/80   Pulse: 80        Physical Exam   General Appearance: Alert, in no acute distress   Head: Normocephalic, without obvious abnormality, atraumatic   Eyes: Lids and lashes normal, conjunctivae and sclerae normal, no icterus, no pallor, corneas clear, PERRLA   Ears: Ears appear intact with no abnormalities noted   Chest Wall: Symmetrical respiratory expansion   Abdomen: No masses, no organomegaly, soft non-tender, non-distended, no guarding   Extremities: Moves all extremities well, no edema, no cyanosis, no redness   Skin: No bleeding, bruising or rash   Neurologic: Cranial nerves 2 - 12 grossly intact, no focal deficits         Diagnoses and all orders for this visit:    1. Left lower quadrant abdominal pain (Primary)    Impressions and plan #1 left lower quadrant pain: Went over the CAT scan with the radiologist today.  There may be a few small diverticuli noted in the colon.  There was no significant inflammatory reaction.  The CAT scan was taken more than a week after the start of the pain.  It was probably more like 10 days.  It is possible that he had diverticulitis that had largely resolved by the time he had the CAT scan.  In fact, 2 to 3 days after he was seen in the emergency room his pain had completely resolved.  I think it would be wisest to perform a colonoscopy to evaluate.  If he has another attack of bad pain we would like to get the CAT scan earlier in the process.  It would be more likely to show the inflammatory change if this was diverticulitis.  We  talked about diverticulitis at length.  He is going to avoid popcorn, sunflower seeds, and pumpkin seeds.  It is interesting that he was eating quite a few pumpkin seeds prior to his attack.  There is no proof that this makes a difference but historically, many people of told me they have trouble after eating popcorn for example.  He seems to have a good understanding.  I did go over his x-rays with him.              Vipul Dumont MD

## 2022-05-16 ENCOUNTER — LAB (OUTPATIENT)
Dept: LAB | Facility: HOSPITAL | Age: 37
End: 2022-05-16

## 2022-05-16 DIAGNOSIS — E78.00 HYPERCHOLESTEROLEMIA WITH LDL GREATER THAN 190 MG/DL: ICD-10-CM

## 2022-05-16 LAB
ALBUMIN SERPL-MCNC: 5.2 G/DL (ref 3.5–5.2)
ALBUMIN/GLOB SERPL: 1.9 G/DL
ALP SERPL-CCNC: 92 U/L (ref 39–117)
ALT SERPL W P-5'-P-CCNC: 30 U/L (ref 1–41)
ANION GAP SERPL CALCULATED.3IONS-SCNC: 12.5 MMOL/L (ref 5–15)
AST SERPL-CCNC: 18 U/L (ref 1–40)
BILIRUB SERPL-MCNC: 0.5 MG/DL (ref 0–1.2)
BUN SERPL-MCNC: 14 MG/DL (ref 6–20)
BUN/CREAT SERPL: 12.7 (ref 7–25)
CALCIUM SPEC-SCNC: 9.8 MG/DL (ref 8.6–10.5)
CHLORIDE SERPL-SCNC: 102 MMOL/L (ref 98–107)
CHOLEST SERPL-MCNC: 202 MG/DL (ref 0–200)
CO2 SERPL-SCNC: 24.5 MMOL/L (ref 22–29)
CREAT SERPL-MCNC: 1.1 MG/DL (ref 0.76–1.27)
EGFRCR SERPLBLD CKD-EPI 2021: 88.1 ML/MIN/1.73
GLOBULIN UR ELPH-MCNC: 2.7 GM/DL
GLUCOSE SERPL-MCNC: 96 MG/DL (ref 65–99)
HDLC SERPL-MCNC: 47 MG/DL (ref 40–60)
LDLC SERPL CALC-MCNC: 119 MG/DL (ref 0–100)
LDLC/HDLC SERPL: 2.41 {RATIO}
POTASSIUM SERPL-SCNC: 4.2 MMOL/L (ref 3.5–5.2)
PROT SERPL-MCNC: 7.9 G/DL (ref 6–8.5)
SODIUM SERPL-SCNC: 139 MMOL/L (ref 136–145)
TRIGL SERPL-MCNC: 208 MG/DL (ref 0–150)
VLDLC SERPL-MCNC: 36 MG/DL (ref 5–40)

## 2022-05-16 PROCEDURE — 80061 LIPID PANEL: CPT

## 2022-05-16 PROCEDURE — 80053 COMPREHEN METABOLIC PANEL: CPT

## 2022-05-17 ENCOUNTER — OFFICE VISIT (OUTPATIENT)
Dept: INTERNAL MEDICINE | Facility: CLINIC | Age: 37
End: 2022-05-17

## 2022-05-17 VITALS
HEIGHT: 70 IN | SYSTOLIC BLOOD PRESSURE: 130 MMHG | WEIGHT: 261 LBS | BODY MASS INDEX: 37.37 KG/M2 | DIASTOLIC BLOOD PRESSURE: 88 MMHG | TEMPERATURE: 98.7 F | HEART RATE: 76 BPM

## 2022-05-17 DIAGNOSIS — E66.01 SEVERE OBESITY WITH BODY MASS INDEX (BMI) OF 35.0 TO 39.9 WITH COMORBIDITY: Chronic | ICD-10-CM

## 2022-05-17 DIAGNOSIS — E78.00 HYPERCHOLESTEROLEMIA WITH LDL GREATER THAN 190 MG/DL: Primary | Chronic | ICD-10-CM

## 2022-05-17 DIAGNOSIS — R10.9 ABDOMINAL PAIN, UNSPECIFIED ABDOMINAL LOCATION: ICD-10-CM

## 2022-05-17 PROCEDURE — 99214 OFFICE O/P EST MOD 30 MIN: CPT | Performed by: STUDENT IN AN ORGANIZED HEALTH CARE EDUCATION/TRAINING PROGRAM

## 2022-05-17 RX ORDER — ATORVASTATIN CALCIUM 20 MG/1
20 TABLET, FILM COATED ORAL DAILY
Qty: 30 TABLET | Refills: 5 | Status: SHIPPED | OUTPATIENT
Start: 2022-05-17 | End: 2022-12-06 | Stop reason: SDUPTHER

## 2022-05-17 NOTE — PROGRESS NOTES
"Chief Complaint  Brett Glasgow is a 36 y.o. male presenting for Hyperlipidemia (Follow up ).     Grew up in [place], [place]. Living in [place] since . Both his parents  early  of natural causes. Patient is . Has boy born . 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     Patient has a past medical history of MDD, TABATHA, hyperlipidemia () and obesity.    History of Present Illness  Patient is here for follow-up.    Patient did drop his LDL from 208 to 100 after starting atorvastatin 10 mg.  Because of this we only increased his atorvastatin to 20 mg, however he just relapsed a few days ago that pharmacy had dispensed 10 mg instead of 20 mg, also he did not really increase as intended.    Patient does have follow-up with GI Dr. Dumont in July.  He does have some vocal cord dysfunction.    Patient also is concerned about his weight, he did gain a few pounds since last visit.  He is considering potentially weight loss medication versus lifestyle changes.    The following portions of the patient's history were reviewed and updated as appropriate: allergies, current medications, past family history, past medical history, past social history, past surgical history and problem list.    Objective  /88 (BP Location: Left arm, Patient Position: Sitting, Cuff Size: Adult)   Pulse 76   Temp 98.7 °F (37.1 °C) (Temporal)   Ht 177.8 cm (70\")   Wt 118 kg (261 lb)   BMI 37.45 kg/m²     Physical Exam  Constitutional:       Appearance: Normal appearance.   Eyes:      Extraocular Movements: Extraocular movements intact.      Conjunctiva/sclera: Conjunctivae normal.   Musculoskeletal:      Cervical back: Neck supple.   Neurological:      Mental Status: He is alert and oriented to person, place, and time. Mental status is at baseline.   Psychiatric:         Behavior: Behavior normal.         Thought Content: Thought content normal. "         Assessment/Plan   1. Hypercholesterolemia with LDL greater than 190 mg/dL  Patient will increase his atorvastatin from 10 to 20 mg.  Because of the unexpected large drop in LDL after starting atorvastatin 10 mg, he might be a slow metabolizer, so for now we will hold off on high intensity dose, which would be at least 20 mg.  Patient will do fasting blood work a few days before his next visit.  - atorvastatin (LIPITOR) 20 MG tablet; Take 1 tablet by mouth Daily.  Dispense: 30 tablet; Refill: 5  - Comprehensive Metabolic Panel; Future  - Lipid Panel; Future    2. Abdominal pain, unspecified abdominal location  Continue follow-up with Dr. Dumont    3. Severe obesity with body mass index (BMI) of 35.0 to 39.9 with comorbidity (HCC)  Class 2 Severe Obesity (BMI >=35 and <=39.9). Obesity-related health conditions include the following: dyslipidemias. Obesity is worsening. BMI is is above average; BMI management plan is completed. We discussed portion control and increasing exercise.  I have encouraged patient to focus on lifestyle, specifically reducing portion sizes, avoiding snacking in between meals.  He is interested in pursuing medication for weight loss if no improvements.  He will follow-up with me in September.    Return in about 4 months (around 9/17/2022) for Recheck.    Future Appointments       Provider Department Center    7/12/2022 11:00 AM Vipul Dumont MD [facility] GASTROENTEROLOGY [place]        Fito Feliz MD  Family Medicine  05/17/2022    Answers for HPI/ROS submitted by the patient on 5/17/2022  What is the primary reason for your visit?: Other  Please describe your symptoms.: No symptoms.  This is following up on Cholesterol.  Have you had these symptoms before?: Yes  How long have you been having these symptoms?: Greater than 2 weeks  Please list any medications you are currently taking for this condition.: Atorvastatin 10 mg

## 2022-06-30 RX ORDER — SODIUM, POTASSIUM,MAG SULFATES 17.5-3.13G
SOLUTION, RECONSTITUTED, ORAL ORAL
Qty: 354 ML | Refills: 0 | Status: SHIPPED | OUTPATIENT
Start: 2022-06-30 | End: 2022-12-13

## 2022-07-12 ENCOUNTER — OUTSIDE FACILITY SERVICE (OUTPATIENT)
Dept: GASTROENTEROLOGY | Facility: CLINIC | Age: 37
End: 2022-07-12

## 2022-07-12 PROCEDURE — 45378 DIAGNOSTIC COLONOSCOPY: CPT | Performed by: INTERNAL MEDICINE

## 2022-10-20 ENCOUNTER — LAB (OUTPATIENT)
Dept: LAB | Facility: HOSPITAL | Age: 37
End: 2022-10-20

## 2022-10-20 DIAGNOSIS — E78.00 HYPERCHOLESTEROLEMIA WITH LDL GREATER THAN 190 MG/DL: Chronic | ICD-10-CM

## 2022-10-20 PROCEDURE — 80053 COMPREHEN METABOLIC PANEL: CPT

## 2022-10-20 PROCEDURE — 80061 LIPID PANEL: CPT

## 2022-10-21 LAB
ALBUMIN SERPL-MCNC: 4.6 G/DL (ref 3.5–5.2)
ALBUMIN/GLOB SERPL: 1.8 G/DL
ALP SERPL-CCNC: 79 U/L (ref 39–117)
ALT SERPL W P-5'-P-CCNC: 22 U/L (ref 1–41)
ANION GAP SERPL CALCULATED.3IONS-SCNC: 13.1 MMOL/L (ref 5–15)
AST SERPL-CCNC: 16 U/L (ref 1–40)
BILIRUB SERPL-MCNC: 0.5 MG/DL (ref 0–1.2)
BUN SERPL-MCNC: 14 MG/DL (ref 6–20)
BUN/CREAT SERPL: 10.9 (ref 7–25)
CALCIUM SPEC-SCNC: 9.8 MG/DL (ref 8.6–10.5)
CHLORIDE SERPL-SCNC: 102 MMOL/L (ref 98–107)
CHOLEST SERPL-MCNC: 130 MG/DL (ref 0–200)
CO2 SERPL-SCNC: 23.9 MMOL/L (ref 22–29)
CREAT SERPL-MCNC: 1.28 MG/DL (ref 0.76–1.27)
EGFRCR SERPLBLD CKD-EPI 2021: 73 ML/MIN/1.73
GLOBULIN UR ELPH-MCNC: 2.5 GM/DL
GLUCOSE SERPL-MCNC: 89 MG/DL (ref 65–99)
HDLC SERPL-MCNC: 45 MG/DL (ref 40–60)
LDLC SERPL CALC-MCNC: 71 MG/DL (ref 0–100)
LDLC/HDLC SERPL: 1.57 {RATIO}
POTASSIUM SERPL-SCNC: 4.4 MMOL/L (ref 3.5–5.2)
PROT SERPL-MCNC: 7.1 G/DL (ref 6–8.5)
SODIUM SERPL-SCNC: 139 MMOL/L (ref 136–145)
TRIGL SERPL-MCNC: 71 MG/DL (ref 0–150)
VLDLC SERPL-MCNC: 14 MG/DL (ref 5–40)

## 2022-12-06 ENCOUNTER — PATIENT MESSAGE (OUTPATIENT)
Dept: INTERNAL MEDICINE | Facility: CLINIC | Age: 37
End: 2022-12-06

## 2022-12-06 DIAGNOSIS — E78.00 HYPERCHOLESTEROLEMIA WITH LDL GREATER THAN 190 MG/DL: Chronic | ICD-10-CM

## 2022-12-06 DIAGNOSIS — F33.1 MDD (MAJOR DEPRESSIVE DISORDER), RECURRENT EPISODE, MODERATE: ICD-10-CM

## 2022-12-06 RX ORDER — ATORVASTATIN CALCIUM 20 MG/1
20 TABLET, FILM COATED ORAL DAILY
Qty: 30 TABLET | Refills: 5 | Status: SHIPPED | OUTPATIENT
Start: 2022-12-06

## 2022-12-06 RX ORDER — BUPROPION HYDROCHLORIDE 300 MG/1
300 TABLET ORAL DAILY
Qty: 30 TABLET | Refills: 5 | Status: SHIPPED | OUTPATIENT
Start: 2022-12-06

## 2022-12-06 NOTE — TELEPHONE ENCOUNTER
From: Brett Glasgow  To: Fito Feliz MD  Sent: 12/6/2022 1:22 PM EST  Subject: Bupropion and Atorvastatin     My refills to my bupropion and Atorvastatin have ran out and I will be short a few pills a couple of days before our next appointment. Can I receive a refill for both of these prescriptions sent to my pharmacy ([facility])?    Thank you.

## 2022-12-13 ENCOUNTER — OFFICE VISIT (OUTPATIENT)
Dept: INTERNAL MEDICINE | Facility: CLINIC | Age: 37
End: 2022-12-13

## 2022-12-13 VITALS
HEART RATE: 68 BPM | BODY MASS INDEX: 31.95 KG/M2 | SYSTOLIC BLOOD PRESSURE: 118 MMHG | TEMPERATURE: 98 F | DIASTOLIC BLOOD PRESSURE: 88 MMHG | WEIGHT: 228.2 LBS | HEIGHT: 71 IN

## 2022-12-13 DIAGNOSIS — Z00.00 WELL ADULT EXAM: Primary | ICD-10-CM

## 2022-12-13 DIAGNOSIS — E66.9 OBESITY, CLASS I, BMI 30-34.9: ICD-10-CM

## 2022-12-13 DIAGNOSIS — E78.00 HYPERCHOLESTEROLEMIA WITH LDL GREATER THAN 190 MG/DL: Chronic | ICD-10-CM

## 2022-12-13 DIAGNOSIS — F33.41 RECURRENT MAJOR DEPRESSIVE DISORDER, IN PARTIAL REMISSION: Chronic | ICD-10-CM

## 2022-12-13 DIAGNOSIS — F41.1 GAD (GENERALIZED ANXIETY DISORDER): Chronic | ICD-10-CM

## 2022-12-13 PROBLEM — F33.1 MDD (MAJOR DEPRESSIVE DISORDER), RECURRENT EPISODE, MODERATE: Chronic | Status: ACTIVE | Noted: 2021-10-12

## 2022-12-13 PROBLEM — R74.01 ELEVATED ALANINE AMINOTRANSFERASE (ALT) LEVEL: Status: RESOLVED | Noted: 2022-01-05 | Resolved: 2022-12-13

## 2022-12-13 PROBLEM — K57.30 SIGMOID DIVERTICULOSIS: Status: ACTIVE | Noted: 2022-12-13

## 2022-12-13 PROBLEM — E66.811 OBESITY, CLASS I, BMI 30-34.9: Status: ACTIVE | Noted: 2021-10-12

## 2022-12-13 PROCEDURE — 99395 PREV VISIT EST AGE 18-39: CPT | Performed by: STUDENT IN AN ORGANIZED HEALTH CARE EDUCATION/TRAINING PROGRAM

## 2022-12-13 RX ORDER — MULTIPLE VITAMINS W/ MINERALS TAB 9MG-400MCG
1 TAB ORAL DAILY
COMMUNITY

## 2022-12-13 NOTE — PROGRESS NOTES
"Chief Complaint  Brett Glasgow is a 37 y.o. male presenting for Annual Exam.     Grew up in [place], [place]. Living in [place] since . Both his parents  early  of natural causes. Patient is . Has son born . Works at "[employer]t of [employer] as  for undergraduate students, does public speaking daily.     Patient has a past medical history of MDD, TABATHA, hyperlipidemia (), sigmoid diverticulosis and obesity.    History of Present Illness  Patient is here for annual physical.    Is overall doing well.  He feels his anxiety and depression is fairly stable, still mild symptoms, but overall well functioning.    Patient has made significant lifestyle changes.  He is using the [employer] avery on his phone and has lost about 33 pounds since his last visit in May of this year.  His goal weight is 180 lbs. he continues to exercise 3 days a week, up to 45 minutes each time, mostly intensive aerobic training, he also plans to do weights.    Of note patient had COVID on 10/22/2022.  He recovered well.    The following portions of the patient's history were reviewed and updated as appropriate: allergies, current medications, past family history, past medical history, past social history, past surgical history and problem list.      Objective  /88 (BP Location: Left arm, Patient Position: Sitting, Cuff Size: Large Adult)   Pulse 68   Temp 98 °F (36.7 °C) (Temporal)   Ht 179.5 cm (70.67\")   Wt 104 kg (228 lb 3.2 oz)   BMI 32.13 kg/m²     Physical Exam  Vitals reviewed.   Constitutional:       Appearance: Normal appearance.   HENT:      Head: Normocephalic and atraumatic.      Nose: No congestion.   Eyes:      Extraocular Movements: Extraocular movements intact.      Conjunctiva/sclera: Conjunctivae normal.   Cardiovascular:      Rate and Rhythm: Normal rate and regular rhythm.      Heart sounds: Normal heart sounds. No murmur heard.  Pulmonary:      Effort: Pulmonary effort is normal.      " Breath sounds: Normal breath sounds.   Abdominal:      General: There is no distension.      Palpations: Abdomen is soft. There is no mass.      Tenderness: There is no abdominal tenderness.   Musculoskeletal:      Cervical back: Neck supple. No tenderness.      Right lower leg: No edema.      Left lower leg: No edema.   Lymphadenopathy:      Cervical: No cervical adenopathy.   Skin:     General: Skin is warm and dry.   Neurological:      Mental Status: He is alert and oriented to person, place, and time. Mental status is at baseline.   Psychiatric:         Behavior: Behavior normal.         Thought Content: Thought content normal.         Assessment/Plan   1. Well adult exam  Counseled on recommendations for COVID booster 2 months after COVID.  He recently had COVID in October.  Epic has trigger on pneumococcus 20 vaccine, possibly triggered by mildly elevated ALT on one occasion, repeat liver enzymes normal.  Based on this I do not think this qualifies for indication, but I did offer him the vaccine and he declines at this time.  He would be due at age 63, sooner if other defining condition would occur.  Of note his colonoscopy showed diverticulosis of the sigmoid colon, otherwise no concerning findings.  No polyps.  No family history of polyps or colorectal cancer.  With that he would be recommended to repeat colonoscopy after 10 years.  With new recommendations colon cancer screening starts at age 45.    2. Hypercholesterolemia with LDL greater than 190 mg/dL  Levels reassuring.  We did discuss the option of increasing to high intensity statin dose, which would be Lipitor/atorvastatin at 40 mg.  Patient wants to hold off for now.  I have explained that this is a strong scientific recommendation, but again patient is overall in good health and is currently working actively on his lifestyle.  - Lipid Panel; Future  - Comprehensive Metabolic Panel; Future    3. TABATHA (generalized anxiety disorder)  4. Recurrent major  depressive disorder, in partial remission (HCC)  Overall doing well.  Patient feels he has mild symptoms of anxiety and depression, but does not feel the need to increase the dose at the moment.    5. Obesity, Class I, BMI 30-34.9  BMI is >= 30 and <35. (Class 1 Obesity). The following options were offered after discussion;: exercise counseling/recommendations, nutrition counseling/recommendations and Continue using [employer] avery    Return in about 6 months (around 6/13/2023) for Recheck.    Future Appointments       Provider Department Center    5/26/2023 9:00 AM Fito Feliz MD [facility]            Fito Feliz MD  Family Medicine  12/13/2022

## 2023-06-16 ENCOUNTER — TELEPHONE (OUTPATIENT)
Dept: INTERNAL MEDICINE | Facility: CLINIC | Age: 38
End: 2023-06-16
Payer: COMMERCIAL

## 2023-06-16 DIAGNOSIS — F33.1 MDD (MAJOR DEPRESSIVE DISORDER), RECURRENT EPISODE, MODERATE: ICD-10-CM

## 2023-06-16 DIAGNOSIS — E78.00 HYPERCHOLESTEROLEMIA WITH LDL GREATER THAN 190 MG/DL: Chronic | ICD-10-CM

## 2023-06-16 RX ORDER — BUPROPION HYDROCHLORIDE 300 MG/1
TABLET ORAL
Qty: 30 TABLET | Refills: 2 | Status: SHIPPED | OUTPATIENT
Start: 2023-06-16

## 2023-06-16 RX ORDER — ATORVASTATIN CALCIUM 20 MG/1
TABLET, FILM COATED ORAL
Qty: 30 TABLET | Refills: 2 | Status: SHIPPED | OUTPATIENT
Start: 2023-06-16

## 2023-06-16 NOTE — TELEPHONE ENCOUNTER
Please reach out and check with patient, he was supposed to follow-up with me in May, but it looks like he canceled.  I have refilled medication for 3 months.

## 2023-06-16 NOTE — TELEPHONE ENCOUNTER
Rx Refill Note  Requested Prescriptions     Pending Prescriptions Disp Refills    atorvastatin (LIPITOR) 20 MG tablet [Pharmacy Med Name: ATORVASTATIN 20MG TABLETS] 30 tablet 5     Sig: TAKE 1 TABLET BY MOUTH EVERY DAY    buPROPion XL (WELLBUTRIN XL) 300 MG 24 hr tablet [Pharmacy Med Name: BUPROPION XL 300MG TABLETS] 30 tablet 5     Sig: TAKE 1 TABLET BY MOUTH EVERY DAY      Last office visit with prescribing clinician: 12/13/2022   Last telemedicine visit with prescribing clinician: Visit date not found   Next office visit with prescribing clinician: Visit date not found                         Would you like a call back once the refill request has been completed: [] Yes [] No    If the office needs to give you a call back, can they leave a voicemail: [] Yes [] No    Shannan Harkins LPN  06/16/23, 08:54 EDT

## 2023-07-18 PROBLEM — M72.2 PLANTAR FASCIITIS OF RIGHT FOOT: Status: ACTIVE | Noted: 2023-07-18

## 2023-09-18 DIAGNOSIS — E78.00 HYPERCHOLESTEROLEMIA WITH LDL GREATER THAN 190 MG/DL: Chronic | ICD-10-CM

## 2023-09-18 RX ORDER — ATORVASTATIN CALCIUM 20 MG/1
TABLET, FILM COATED ORAL
Qty: 30 TABLET | Refills: 2 | Status: SHIPPED | OUTPATIENT
Start: 2023-09-18

## 2023-12-31 DIAGNOSIS — E78.00 HYPERCHOLESTEROLEMIA WITH LDL GREATER THAN 190 MG/DL: Chronic | ICD-10-CM

## 2024-01-02 RX ORDER — ATORVASTATIN CALCIUM 20 MG/1
TABLET, FILM COATED ORAL
Qty: 30 TABLET | Refills: 2 | Status: SHIPPED | OUTPATIENT
Start: 2024-01-02

## 2024-01-30 ENCOUNTER — OFFICE VISIT (OUTPATIENT)
Dept: INTERNAL MEDICINE | Facility: CLINIC | Age: 39
End: 2024-01-30
Payer: COMMERCIAL

## 2024-01-30 VITALS
TEMPERATURE: 99.1 F | BODY MASS INDEX: 37.65 KG/M2 | SYSTOLIC BLOOD PRESSURE: 120 MMHG | HEIGHT: 70 IN | OXYGEN SATURATION: 99 % | WEIGHT: 263 LBS | DIASTOLIC BLOOD PRESSURE: 80 MMHG | HEART RATE: 90 BPM

## 2024-01-30 DIAGNOSIS — J10.1 INFLUENZA A: Primary | ICD-10-CM

## 2024-01-30 DIAGNOSIS — R50.9 FEVER, UNSPECIFIED FEVER CAUSE: ICD-10-CM

## 2024-01-30 LAB
EXPIRATION DATE: ABNORMAL
FLUAV AG UPPER RESP QL IA.RAPID: DETECTED
FLUBV AG UPPER RESP QL IA.RAPID: NOT DETECTED
INTERNAL CONTROL: ABNORMAL
Lab: ABNORMAL
SARS-COV-2 AG UPPER RESP QL IA.RAPID: NOT DETECTED

## 2024-01-30 PROCEDURE — 99213 OFFICE O/P EST LOW 20 MIN: CPT | Performed by: INTERNAL MEDICINE

## 2024-01-30 PROCEDURE — 87428 SARSCOV & INF VIR A&B AG IA: CPT | Performed by: INTERNAL MEDICINE

## 2024-01-30 RX ORDER — OSELTAMIVIR PHOSPHATE 75 MG/1
75 CAPSULE ORAL 2 TIMES DAILY
Qty: 10 CAPSULE | Refills: 1 | Status: SHIPPED | OUTPATIENT
Start: 2024-01-30

## 2024-01-30 NOTE — PATIENT INSTRUCTIONS
POC influenza A is positive  Symptomatic treatment for cough and fever  Tamiflu 75 mg twice daily  Quarantine and no work, and return to work okay Monday, February 5  Family is to contact  primary care for probable prophylaxis  To inform us if not improved

## 2024-01-30 NOTE — ASSESSMENT & PLAN NOTE
OTC therapy symptomatic  Tamiflu 75 mg twice daily  Return to work Monday, February 5  Let office know if not improving

## 2024-01-30 NOTE — PROGRESS NOTES
[facility]     [facility]  1985   [id number]      Patient Care Team:  Fito Feliz MD as PCP - General (Family Medicine)    Chief Complaint::   Chief Complaint   Patient presents with    Fatigue     With SOB, fever up to 101, body aches.  Symptoms started yesterday afternoon.  Home covid negative.             HPI  The patient is a 38-year-old male complaining of fatigue, shortness of breath, fever to 101 degrees Fahrenheit, and body aches. His symptoms started yesterday afternoon, 01/29/2024. His home COVID-19 test was negative.    The patient works for the [employer]. He could have been in a room with influenza A. He is  and has children. He has not been vaccinated for influenza. His wife has a primary care physician at . He is not sick at his stomach. He has taken DayQuil and NyQuil. He has had more productive coughs today. He has a slightly sore throat. He denies any wheezing.      Patient Active Problem List   Diagnosis    Recurrent major depressive disorder, in partial remission    TABATHA (generalized anxiety disorder)    Obesity, Class I, BMI 30-34.9    Hypercholesterolemia with LDL greater than 190 mg/dL    Sigmoid diverticulosis    Plantar fasciitis of right foot    Influenza A        Past Medical History:   Diagnosis Date    Anxiety     Depression     Hypercholesterolemia with LDL greater than 190 mg/dL 10/25/2021    10/2021     Sigmoid diverticulosis 12/13/2022    On colonoscopy 2022       History reviewed. No pertinent surgical history.    Family History   Problem Relation Age of Onset    Thyroid disease Mother     Mental illness Father     Arthritis Father     Hypertension Father     Colon cancer Neg Hx     Colon polyps Neg Hx        Social History     Socioeconomic History    Marital status:    Tobacco Use    Smoking status: Never    Smokeless tobacco: Never   Vaping Use    Vaping Use: Never used   Substance and Sexual Activity    Alcohol  "use: Yes     Alcohol/week: 3.0 standard drinks of alcohol     Types: 3 Drinks containing 0.5 oz of alcohol per week     Comment: 3-5 drinks weekly    Drug use: Never    Sexual activity: Yes     Partners: Female       No Known Allergies    Review of Systems     HEENT: Denies any hearing changes, eyesight changes, no headache or dizziness  NECK: Denies any pain, stiffness, swelling or dysphagia  CHEST: Denies cough or wheeze or recent lung infections mild shortness of breath  CARDIAC: Denies chest pain, pressure or palpitations  ABD: Denies nausea, vomiting or abdominal pain  : Denies dysuria  NEURO: Denies syncope, concussion, neuropathy  PSYCH: Denies any stress  EXTREM: Denies arthritic changes, complains of arthralgia myalgia  SKIN: Denies any rash      Vital Signs  Vitals:    01/30/24 1045   BP: 120/80   BP Location: Left arm   Patient Position: Sitting   Cuff Size: Adult   Pulse: 90   Temp: 99.1 °F (37.3 °C)   TempSrc: Infrared   SpO2: 99%   Weight: 119 kg (263 lb)   Height: 177.8 cm (70\")   PainSc:   4   PainLoc: Generalized     Body mass index is 37.74 kg/m².        Advance Care Planning         Current Outpatient Medications:     atorvastatin (LIPITOR) 20 MG tablet, TAKE 1 TABLET BY MOUTH EVERY DAY, Disp: 30 tablet, Rfl: 2    buPROPion XL (WELLBUTRIN XL) 150 MG 24 hr tablet, Take 1 tablet by mouth Daily., Disp: 30 tablet, Rfl: 5    multivitamin with minerals tablet tablet, Take 1 tablet by mouth Daily., Disp: , Rfl:     oseltamivir (Tamiflu) 75 MG capsule, Take 1 capsule by mouth 2 (Two) Times a Day., Disp: 10 capsule, Rfl: 1    Physical Exam     ACE III MINI      HEENT: Pupils equal reactive ENT clear, no facial asymmetry, the pharynx is clear  NECK: No masses bruit or thyromegaly  CHEST: Clear without rales wheezes or rhonchi  CARDIAC: Regular rhythm without gallop rub or click, blood pressure 130/80 mmHg, heart rate slightly elevated  ABD: Liver spleen normal, good bowel sounds, nontender  : " Deferred  NEURO: Intact  PSYCH: Normal  EXTREM: No significant arthritic changes, no edema  SKIN: Clear     Results Review:    Recent Results (from the past 672 hour(s))   POCT SARS-CoV-2 + Flu Antigen JOEY    Collection Time: 01/30/24 10:59 AM    Specimen: Swab   Result Value Ref Range    SARS Antigen Not Detected Not Detected, Presumptive Negative    Influenza A Antigen JOEY Detected (A) Not Detected    Influenza B Antigen JOEY Not Detected Not Detected    Internal Control Passed Passed    Lot Number [id number]     Expiration Date 1,172,025      Procedures POC influenza A positive    Medication Review: Medications reviewed and noted    Patient wellness counseling  Exercise: Suggest rest with current infection of influenza A  Diet: Encouraged healthy cardiac diet with weight loss  Screening: POC COVID-19 and influenza A influenza B positive for influenza A  Social History     Socioeconomic History    Marital status:    Tobacco Use    Smoking status: Never    Smokeless tobacco: Never   Vaping Use    Vaping Use: Never used   Substance and Sexual Activity    Alcohol use: Yes     Alcohol/week: 3.0 standard drinks of alcohol     Types: 3 Drinks containing 0.5 oz of alcohol per week     Comment: 3-5 drinks weekly    Drug use: Never    Sexual activity: Yes     Partners: Female        Assessment/Plan:    Problem List Items Addressed This Visit          Infectious Diseases    Influenza A - Primary    Overview     POC COVID-19 and influenza B are negative  POC for influenza A positive  Symptomatic treatment for cough and fever  Tamiflu 75 mg twice daily sent to drugstore  Encouraged spouse to call her PCP for prophylactic therapy and call pediatrician for children's prophylaxis if appropriate         Current Assessment & Plan     OTC therapy symptomatic  Tamiflu 75 mg twice daily  Return to work Monday, February 5  Let office know if not improving         Relevant Medications    oseltamivir (Tamiflu) 75 MG capsule      Other Visit Diagnoses       Fever, unspecified fever cause        Relevant Orders    POCT SARS-CoV-2 + Flu Antigen JOEY (Completed)             Patient Instructions   POC influenza A is positive  Symptomatic treatment for cough and fever  Tamiflu 75 mg twice daily  Quarantine and no work, and return to work okay Monday, February 5  Family is to contact  primary care for probable prophylaxis  To inform us if not improved       Plan of care reviewed with patient at the conclusion of today's visit. Education was provided regarding diagnosis, management, and any prescribed or recommended OTC medications.Patient verbalizes understanding of and agreement with management plan.         Den De La Torre MD      Note: Part of this note may be an electronic transcription/translation of spoken language to printed text using the Dragon Dictation system.      Transcribed from ambient dictation for Den De La Torre MD by Ceci Cohen.  01/30/24   12:31 EST    Patient or patient representative verbalized consent to the visit recording.  I have personally performed the services described in this document as transcribed by the above individual, and it is both accurate and complete.

## 2024-02-02 DIAGNOSIS — F33.42 RECURRENT MAJOR DEPRESSIVE DISORDER, IN FULL REMISSION: ICD-10-CM

## 2024-02-02 RX ORDER — BUPROPION HYDROCHLORIDE 150 MG/1
150 TABLET ORAL DAILY
Qty: 30 TABLET | Refills: 5 | Status: SHIPPED | OUTPATIENT
Start: 2024-02-02

## 2024-03-15 ENCOUNTER — LAB (OUTPATIENT)
Dept: LAB | Facility: HOSPITAL | Age: 39
End: 2024-03-15
Payer: COMMERCIAL

## 2024-03-15 ENCOUNTER — OFFICE VISIT (OUTPATIENT)
Dept: INTERNAL MEDICINE | Facility: CLINIC | Age: 39
End: 2024-03-15
Payer: COMMERCIAL

## 2024-03-15 VITALS
SYSTOLIC BLOOD PRESSURE: 120 MMHG | HEART RATE: 64 BPM | TEMPERATURE: 98.7 F | WEIGHT: 261.6 LBS | HEIGHT: 70 IN | DIASTOLIC BLOOD PRESSURE: 86 MMHG | BODY MASS INDEX: 37.45 KG/M2

## 2024-03-15 DIAGNOSIS — S29.9XXA INJURY OF CHEST WALL, INITIAL ENCOUNTER: ICD-10-CM

## 2024-03-15 DIAGNOSIS — E78.00 HYPERCHOLESTEROLEMIA WITH LDL GREATER THAN 190 MG/DL: Chronic | ICD-10-CM

## 2024-03-15 DIAGNOSIS — F33.41 RECURRENT MAJOR DEPRESSIVE DISORDER, IN PARTIAL REMISSION: Chronic | ICD-10-CM

## 2024-03-15 DIAGNOSIS — R06.02 SHORTNESS OF BREATH: ICD-10-CM

## 2024-03-15 DIAGNOSIS — R06.02 SHORTNESS OF BREATH: Primary | ICD-10-CM

## 2024-03-15 DIAGNOSIS — Z13.1 SCREENING FOR DIABETES MELLITUS: ICD-10-CM

## 2024-03-15 DIAGNOSIS — E66.01 SEVERE OBESITY WITH BODY MASS INDEX (BMI) OF 35.0 TO 39.9 WITH COMORBIDITY: ICD-10-CM

## 2024-03-15 DIAGNOSIS — R06.09 DOE (DYSPNEA ON EXERTION): ICD-10-CM

## 2024-03-15 PROBLEM — J10.1 INFLUENZA A: Status: RESOLVED | Noted: 2024-01-30 | Resolved: 2024-03-15

## 2024-03-15 PROCEDURE — 80061 LIPID PANEL: CPT

## 2024-03-15 PROCEDURE — 83880 ASSAY OF NATRIURETIC PEPTIDE: CPT

## 2024-03-15 PROCEDURE — 83036 HEMOGLOBIN GLYCOSYLATED A1C: CPT

## 2024-03-15 PROCEDURE — 80050 GENERAL HEALTH PANEL: CPT

## 2024-03-15 NOTE — PROGRESS NOTES
"Chief Complaint  Brett Glasgow is a 38 y.o. male presenting for Weight gain, Shortness of Breath (X's 1 month ), and Fall (Soreness/tender left side ).     Grew up in [place], [place]. Living in [place] since . Both his parents  early  of natural causes. Patient is . Has son born . 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     Patient has a past medical history of MDD, TABATHA, hyperlipidemia (), sigmoid diverticulosis and obesity.    History of Present Illness  Patient is here for several concerns.    He tells me he fell on  4 days ago while he was out walking his dog.  He sustained injury to his left chest.  No bruising.  Cough hurts, and he is tender under his left breast.  He tells me he is feeling better today.    He also reports some more shortness of breath, mild dyspnea on exertion.  More when he is reclined or laying flat.  This can last for some minutes.  No chest pain.  Sometimes mild palpitations.  He is able to exert himself without any severe symptoms.    Patient also is concerned about weight gain since last year.  He would like some blood work done.  Has not really changed his diet.  He gets close to 10,000 steps every day.    He did discontinue Wellbutrin, he feels his mental health has been better and is currently doing well.    The following portions of the patient's history were reviewed and updated as appropriate: allergies, current medications, past family history, past medical history, past social history, past surgical history, and problem list.    Objective  /86 (BP Location: Left arm, Patient Position: Sitting, Cuff Size: Large Adult)   Pulse 64   Temp 98.7 °F (37.1 °C) (Temporal)   Ht 177.8 cm (70\")   Wt 119 kg (261 lb 9.6 oz)   BMI 37.54 kg/m²     Physical Exam  Vitals reviewed.   Constitutional:       Appearance: Normal appearance.   HENT:      Head: Normocephalic and atraumatic.      " Nose: Nose normal. No congestion.      Mouth/Throat:      Mouth: Mucous membranes are moist.   Eyes:      Extraocular Movements: Extraocular movements intact.      Conjunctiva/sclera: Conjunctivae normal.   Cardiovascular:      Rate and Rhythm: Normal rate and regular rhythm.      Heart sounds: Normal heart sounds. No murmur heard.  Pulmonary:      Effort: Pulmonary effort is normal.      Breath sounds: Normal breath sounds.   Chest:       Abdominal:      General: There is no distension.      Palpations: Abdomen is soft. There is no mass.      Tenderness: There is no abdominal tenderness.   Musculoskeletal:      Cervical back: Neck supple.      Right lower leg: No edema.      Left lower leg: No edema.   Skin:     General: Skin is warm and dry.   Neurological:      Mental Status: He is alert and oriented to person, place, and time. Mental status is at baseline.   Psychiatric:         Behavior: Behavior normal.         Thought Content: Thought content normal.         Assessment/Plan   1. Shortness of breath  2. MOLINA (dyspnea on exertion)  New problem of unknown cause and prognosis.  Could be thyroid versus anemia versus cardiac versus pulmonary.  Patient is fairly well-appearing.  Benign exam.  Will do blood work as ordered and follow-up in 4 to 6 weeks, sooner if needed.  Consider referral to pulmonology.  - CBC (No Diff); Future  - Comprehensive Metabolic Panel; Future  - TSH Rfx On Abnormal To Free T4; Future  - BNP; Future  - XR Chest PA & Lateral; Future    3. Injury of chest wall, initial encounter  Likely bruising.  Symptoms improving.  Recommend over-the-counter ibuprofen.    4. Hypercholesterolemia with LDL greater than 190 mg/dL  Patient is fasting for lipids today.  - Lipid Panel; Future    5. Recurrent major depressive disorder, in partial remission  Currently doing well.  Continue to monitor off bupropion.    6. Screening for diabetes mellitus  - Hemoglobin A1c; Future    7. Severe obesity with body mass  index (BMI) of 35.0 to 39.9 with comorbidity     Weight increasing.  Encouraged lifestyle activities.  Will do blood work as ordered and follow-up.      Return for Recheck 4-6w.    Future Appointments         Provider Department Center    4/30/2024 8:15 AM Fito Feliz MD [facility]IC[facility] [place]              Fito Feliz MD  Family Medicine  03/15/2024

## 2024-03-16 LAB
ALBUMIN SERPL-MCNC: 4.9 G/DL (ref 3.5–5.2)
ALBUMIN/GLOB SERPL: 2 G/DL
ALP SERPL-CCNC: 83 U/L (ref 39–117)
ALT SERPL W P-5'-P-CCNC: 37 U/L (ref 1–41)
ANION GAP SERPL CALCULATED.3IONS-SCNC: 9 MMOL/L (ref 5–15)
AST SERPL-CCNC: 19 U/L (ref 1–40)
BILIRUB SERPL-MCNC: 0.5 MG/DL (ref 0–1.2)
BUN SERPL-MCNC: 16 MG/DL (ref 6–20)
BUN/CREAT SERPL: 14.8 (ref 7–25)
CALCIUM SPEC-SCNC: 9.8 MG/DL (ref 8.6–10.5)
CHLORIDE SERPL-SCNC: 104 MMOL/L (ref 98–107)
CHOLEST SERPL-MCNC: 195 MG/DL (ref 0–200)
CO2 SERPL-SCNC: 28 MMOL/L (ref 22–29)
CREAT SERPL-MCNC: 1.08 MG/DL (ref 0.76–1.27)
DEPRECATED RDW RBC AUTO: 41.2 FL (ref 37–54)
EGFRCR SERPLBLD CKD-EPI 2021: 89 ML/MIN/1.73
ERYTHROCYTE [DISTWIDTH] IN BLOOD BY AUTOMATED COUNT: 12.8 % (ref 12.3–15.4)
GLOBULIN UR ELPH-MCNC: 2.5 GM/DL
GLUCOSE SERPL-MCNC: 99 MG/DL (ref 65–99)
HBA1C MFR BLD: 5.7 % (ref 4.8–5.6)
HCT VFR BLD AUTO: 44.4 % (ref 37.5–51)
HDLC SERPL-MCNC: 53 MG/DL (ref 40–60)
HGB BLD-MCNC: 14.8 G/DL (ref 13–17.7)
LDLC SERPL CALC-MCNC: 124 MG/DL (ref 0–100)
LDLC/HDLC SERPL: 2.31 {RATIO}
MCH RBC QN AUTO: 29.8 PG (ref 26.6–33)
MCHC RBC AUTO-ENTMCNC: 33.3 G/DL (ref 31.5–35.7)
MCV RBC AUTO: 89.3 FL (ref 79–97)
NT-PROBNP SERPL-MCNC: 53 PG/ML (ref 0–450)
PLATELET # BLD AUTO: 293 10*3/MM3 (ref 140–450)
PMV BLD AUTO: 9.7 FL (ref 6–12)
POTASSIUM SERPL-SCNC: 4.2 MMOL/L (ref 3.5–5.2)
PROT SERPL-MCNC: 7.4 G/DL (ref 6–8.5)
RBC # BLD AUTO: 4.97 10*6/MM3 (ref 4.14–5.8)
SODIUM SERPL-SCNC: 141 MMOL/L (ref 136–145)
TRIGL SERPL-MCNC: 98 MG/DL (ref 0–150)
TSH SERPL DL<=0.05 MIU/L-ACNC: 2.25 UIU/ML (ref 0.27–4.2)
VLDLC SERPL-MCNC: 18 MG/DL (ref 5–40)
WBC NRBC COR # BLD AUTO: 6.1 10*3/MM3 (ref 3.4–10.8)

## 2024-03-18 ENCOUNTER — PATIENT MESSAGE (OUTPATIENT)
Dept: INTERNAL MEDICINE | Facility: CLINIC | Age: 39
End: 2024-03-18
Payer: COMMERCIAL

## 2024-03-18 DIAGNOSIS — E78.00 HYPERCHOLESTEROLEMIA WITH LDL GREATER THAN 190 MG/DL: Chronic | ICD-10-CM

## 2024-03-18 PROBLEM — R73.03 PREDIABETES: Status: ACTIVE | Noted: 2024-03-18

## 2024-03-18 RX ORDER — ATORVASTATIN CALCIUM 40 MG/1
40 TABLET, FILM COATED ORAL DAILY
Qty: 90 TABLET | OUTPATIENT
Start: 2024-03-18

## 2024-03-18 RX ORDER — ATORVASTATIN CALCIUM 40 MG/1
40 TABLET, FILM COATED ORAL DAILY
Qty: 30 TABLET | Refills: 1 | Status: SHIPPED | OUTPATIENT
Start: 2024-03-18

## 2024-04-10 DIAGNOSIS — E78.00 HYPERCHOLESTEROLEMIA WITH LDL GREATER THAN 190 MG/DL: Chronic | ICD-10-CM

## 2024-04-10 RX ORDER — ATORVASTATIN CALCIUM 20 MG/1
TABLET, FILM COATED ORAL
Qty: 30 TABLET | Refills: 2 | OUTPATIENT
Start: 2024-04-10

## 2024-05-06 ENCOUNTER — LAB (OUTPATIENT)
Dept: LAB | Facility: HOSPITAL | Age: 39
End: 2024-05-06
Payer: COMMERCIAL

## 2024-05-06 DIAGNOSIS — E78.00 HYPERCHOLESTEROLEMIA WITH LDL GREATER THAN 190 MG/DL: Chronic | ICD-10-CM

## 2024-05-06 PROCEDURE — 80076 HEPATIC FUNCTION PANEL: CPT

## 2024-05-06 PROCEDURE — 80061 LIPID PANEL: CPT

## 2024-05-07 ENCOUNTER — OFFICE VISIT (OUTPATIENT)
Dept: INTERNAL MEDICINE | Facility: CLINIC | Age: 39
End: 2024-05-07
Payer: COMMERCIAL

## 2024-05-07 VITALS
BODY MASS INDEX: 37.97 KG/M2 | HEIGHT: 70 IN | SYSTOLIC BLOOD PRESSURE: 124 MMHG | HEART RATE: 60 BPM | TEMPERATURE: 98 F | DIASTOLIC BLOOD PRESSURE: 80 MMHG | WEIGHT: 265.2 LBS

## 2024-05-07 DIAGNOSIS — R73.03 PREDIABETES: ICD-10-CM

## 2024-05-07 DIAGNOSIS — E78.00 HYPERCHOLESTEROLEMIA WITH LDL GREATER THAN 190 MG/DL: Primary | Chronic | ICD-10-CM

## 2024-05-07 DIAGNOSIS — E66.01 SEVERE OBESITY WITH BODY MASS INDEX (BMI) OF 35.0 TO 39.9 WITH COMORBIDITY: ICD-10-CM

## 2024-05-07 DIAGNOSIS — F41.1 GAD (GENERALIZED ANXIETY DISORDER): Chronic | ICD-10-CM

## 2024-05-07 LAB
ALBUMIN SERPL-MCNC: 4.5 G/DL (ref 3.5–5.2)
ALP SERPL-CCNC: 90 U/L (ref 39–117)
ALT SERPL W P-5'-P-CCNC: 31 U/L (ref 1–41)
AST SERPL-CCNC: 21 U/L (ref 1–40)
BILIRUB CONJ SERPL-MCNC: <0.2 MG/DL (ref 0–0.3)
BILIRUB INDIRECT SERPL-MCNC: NORMAL MG/DL
BILIRUB SERPL-MCNC: 0.5 MG/DL (ref 0–1.2)
CHOLEST SERPL-MCNC: 138 MG/DL (ref 0–200)
HDLC SERPL-MCNC: 43 MG/DL (ref 40–60)
LDLC SERPL CALC-MCNC: 71 MG/DL (ref 0–100)
LDLC/HDLC SERPL: 1.59 {RATIO}
PROT SERPL-MCNC: 7.1 G/DL (ref 6–8.5)
TRIGL SERPL-MCNC: 134 MG/DL (ref 0–150)
VLDLC SERPL-MCNC: 24 MG/DL (ref 5–40)

## 2024-05-07 PROCEDURE — 99214 OFFICE O/P EST MOD 30 MIN: CPT | Performed by: STUDENT IN AN ORGANIZED HEALTH CARE EDUCATION/TRAINING PROGRAM

## 2024-05-07 RX ORDER — ATORVASTATIN CALCIUM 40 MG/1
40 TABLET, FILM COATED ORAL DAILY
Qty: 30 TABLET | Refills: 5 | Status: SHIPPED | OUTPATIENT
Start: 2024-05-07

## 2024-05-07 RX ORDER — METFORMIN HYDROCHLORIDE 500 MG/1
500 TABLET, EXTENDED RELEASE ORAL
Qty: 30 TABLET | Refills: 5 | Status: SHIPPED | OUTPATIENT
Start: 2024-05-07

## 2024-05-30 DIAGNOSIS — E78.00 HYPERCHOLESTEROLEMIA WITH LDL GREATER THAN 190 MG/DL: Chronic | ICD-10-CM

## 2024-05-30 RX ORDER — ATORVASTATIN CALCIUM 40 MG/1
40 TABLET, FILM COATED ORAL DAILY
Qty: 30 TABLET | Refills: 5 | Status: SHIPPED | OUTPATIENT
Start: 2024-05-30

## 2024-08-01 ENCOUNTER — LAB (OUTPATIENT)
Dept: LAB | Facility: HOSPITAL | Age: 39
End: 2024-08-01
Payer: COMMERCIAL

## 2024-08-01 DIAGNOSIS — R73.03 PREDIABETES: ICD-10-CM

## 2024-08-01 DIAGNOSIS — E78.00 HYPERCHOLESTEROLEMIA WITH LDL GREATER THAN 190 MG/DL: Chronic | ICD-10-CM

## 2024-08-01 LAB
ALBUMIN SERPL-MCNC: 4.7 G/DL (ref 3.5–5.2)
ALBUMIN/GLOB SERPL: 1.7 G/DL
ALP SERPL-CCNC: 95 U/L (ref 39–117)
ALT SERPL W P-5'-P-CCNC: 23 U/L (ref 1–41)
ANION GAP SERPL CALCULATED.3IONS-SCNC: 8 MMOL/L (ref 5–15)
AST SERPL-CCNC: 18 U/L (ref 1–40)
BILIRUB SERPL-MCNC: 0.4 MG/DL (ref 0–1.2)
BUN SERPL-MCNC: 14 MG/DL (ref 6–20)
BUN/CREAT SERPL: 13.1 (ref 7–25)
CALCIUM SPEC-SCNC: 9.9 MG/DL (ref 8.6–10.5)
CHLORIDE SERPL-SCNC: 105 MMOL/L (ref 98–107)
CO2 SERPL-SCNC: 24 MMOL/L (ref 22–29)
CREAT SERPL-MCNC: 1.07 MG/DL (ref 0.76–1.27)
EGFRCR SERPLBLD CKD-EPI 2021: 90 ML/MIN/1.73
GLOBULIN UR ELPH-MCNC: 2.7 GM/DL
GLUCOSE SERPL-MCNC: 79 MG/DL (ref 65–99)
HBA1C MFR BLD: 5.6 % (ref 4.8–5.6)
POTASSIUM SERPL-SCNC: 4.5 MMOL/L (ref 3.5–5.2)
PROT SERPL-MCNC: 7.4 G/DL (ref 6–8.5)
SODIUM SERPL-SCNC: 137 MMOL/L (ref 136–145)

## 2024-08-01 PROCEDURE — 83036 HEMOGLOBIN GLYCOSYLATED A1C: CPT

## 2024-08-01 PROCEDURE — 80053 COMPREHEN METABOLIC PANEL: CPT

## 2024-08-06 ENCOUNTER — OFFICE VISIT (OUTPATIENT)
Dept: INTERNAL MEDICINE | Facility: CLINIC | Age: 39
End: 2024-08-06
Payer: COMMERCIAL

## 2024-08-06 VITALS
TEMPERATURE: 99.3 F | WEIGHT: 252.6 LBS | BODY MASS INDEX: 36.16 KG/M2 | SYSTOLIC BLOOD PRESSURE: 106 MMHG | DIASTOLIC BLOOD PRESSURE: 74 MMHG | HEIGHT: 70 IN | HEART RATE: 67 BPM

## 2024-08-06 DIAGNOSIS — R73.03 PREDIABETES: Chronic | ICD-10-CM

## 2024-08-06 DIAGNOSIS — E66.01 SEVERE OBESITY WITH BODY MASS INDEX (BMI) OF 35.0 TO 39.9 WITH COMORBIDITY: ICD-10-CM

## 2024-08-06 DIAGNOSIS — E78.00 HYPERCHOLESTEROLEMIA WITH LDL GREATER THAN 190 MG/DL: Primary | Chronic | ICD-10-CM

## 2024-08-06 PROCEDURE — 99214 OFFICE O/P EST MOD 30 MIN: CPT | Performed by: STUDENT IN AN ORGANIZED HEALTH CARE EDUCATION/TRAINING PROGRAM

## 2024-08-06 NOTE — PROGRESS NOTES
"Chief Complaint  Brett Glasgow is a 38 y.o. male presenting for Prediabetes.     Grew up in [place], [place]. Living in [place] since . Both his parents  early  of natural causes. Patient is . Has son born . 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     Patient has a past medical history of prediabetes (), MDD, TABATHA, hyperlipidemia (), sigmoid diverticulosis and obesity.    History of Present Illness  Patient is here for follow-up.    He has made some adjustment to his diet, watching portion sizes.  Also he has been walking a lot in his job.  He has not focused on weight loss directly, and this adjustment has not been hard for him.    He is overall doing well.  He has been taking the metformin in the evening, also taking the atorvastatin in the evening.    The following portions of the patient's history were reviewed and updated as appropriate: allergies, current medications, past family history, past medical history, past social history, past surgical history, and problem list.    Objective  /74 (BP Location: Left arm, Patient Position: Sitting, Cuff Size: Adult)   Pulse 67   Temp 99.3 °F (37.4 °C) (Temporal)   Ht 177.8 cm (70\")   Wt 115 kg (252 lb 9.6 oz)   BMI 36.24 kg/m²     Physical Exam  Constitutional:       Appearance: Normal appearance.   HENT:      Head: Normocephalic and atraumatic.   Eyes:      Extraocular Movements: Extraocular movements intact.      Conjunctiva/sclera: Conjunctivae normal.   Pulmonary:      Effort: Pulmonary effort is normal. No respiratory distress.   Musculoskeletal:      Cervical back: Normal range of motion and neck supple.   Neurological:      Mental Status: He is alert and oriented to person, place, and time. Mental status is at baseline.   Psychiatric:         Behavior: Behavior normal.         Thought Content: Thought content normal.         Assessment/Plan   1. " Hypercholesterolemia with LDL greater than 190 mg/dL  Continue on high intensity atorvastatin 40 mg.  Not having any side effects.  He can take this medication anytime of the day, some cholesterol-lowering medications are recommended to take in the evening, but this medication anytime of the day.  Will recheck lipids before next visit in 3 months  - Lipid Panel; Future    2. Prediabetes  Hemoglobin A1C   Date Value Ref Range Status   08/01/2024 5.60 4.80 - 5.60 % Final   03/15/2024 5.70 (H) 4.80 - 5.60 % Final   Improved glycemic control.  He has not had any side effects with metformin.  He also has been able to adjust his diet lose weight.  Will recheck A1c in 3 months.  If still fairly stable or improved, I would likely recommend visits every 6 months.  - Hemoglobin A1c; Future    3. Severe obesity with body mass index (BMI) of 35.0 to 39.9 with comorbidity  Class 2 Severe Obesity (BMI >=35 and <=39.9). Obesity-related health conditions include the following: dyslipidemias. Obesity is improving with lifestyle modifications. BMI is is above average; BMI management plan is completed. We discussed portion control and continue with current plan .      Return in about 13 weeks (around 11/5/2024) for Annual physical.    Future Appointments         Provider Department Center    11/5/2024 8:30 AM Fito Feliz MD [facility] MEDIC[facility] [place]              Fito Feliz MD  Family Medicine  08/06/2024

## 2024-11-01 ENCOUNTER — LAB (OUTPATIENT)
Dept: LAB | Facility: HOSPITAL | Age: 39
End: 2024-11-01
Payer: COMMERCIAL

## 2024-11-01 DIAGNOSIS — E78.00 HYPERCHOLESTEROLEMIA WITH LDL GREATER THAN 190 MG/DL: Chronic | ICD-10-CM

## 2024-11-01 DIAGNOSIS — R73.03 PREDIABETES: Chronic | ICD-10-CM

## 2024-11-01 LAB
CHOLEST SERPL-MCNC: 104 MG/DL (ref 0–200)
HBA1C MFR BLD: 5.5 % (ref 4.8–5.6)
HDLC SERPL-MCNC: 37 MG/DL (ref 40–60)
LDLC SERPL CALC-MCNC: 54 MG/DL (ref 0–100)
LDLC/HDLC SERPL: 1.51 {RATIO}
TRIGL SERPL-MCNC: 55 MG/DL (ref 0–150)
VLDLC SERPL-MCNC: 13 MG/DL (ref 5–40)

## 2024-11-01 PROCEDURE — 83036 HEMOGLOBIN GLYCOSYLATED A1C: CPT

## 2024-11-01 PROCEDURE — 80061 LIPID PANEL: CPT

## 2024-11-04 DIAGNOSIS — R73.03 PREDIABETES: ICD-10-CM

## 2024-11-04 RX ORDER — METFORMIN HYDROCHLORIDE 500 MG/1
500 TABLET, EXTENDED RELEASE ORAL
Qty: 30 TABLET | Refills: 5 | Status: SHIPPED | OUTPATIENT
Start: 2024-11-04

## 2024-11-05 ENCOUNTER — OFFICE VISIT (OUTPATIENT)
Dept: INTERNAL MEDICINE | Facility: CLINIC | Age: 39
End: 2024-11-05
Payer: COMMERCIAL

## 2024-11-05 VITALS
BODY MASS INDEX: 31.27 KG/M2 | DIASTOLIC BLOOD PRESSURE: 82 MMHG | HEART RATE: 65 BPM | HEIGHT: 71 IN | TEMPERATURE: 98 F | OXYGEN SATURATION: 96 % | WEIGHT: 223.4 LBS | SYSTOLIC BLOOD PRESSURE: 106 MMHG

## 2024-11-05 DIAGNOSIS — Z23 NEED FOR INFLUENZA VACCINATION: ICD-10-CM

## 2024-11-05 DIAGNOSIS — E66.811 OBESITY, CLASS I, BMI 30-34.9: ICD-10-CM

## 2024-11-05 DIAGNOSIS — E78.00 HYPERCHOLESTEROLEMIA WITH LDL GREATER THAN 190 MG/DL: Chronic | ICD-10-CM

## 2024-11-05 DIAGNOSIS — Z00.00 WELL ADULT EXAM: Primary | ICD-10-CM

## 2024-11-05 DIAGNOSIS — R73.03 PREDIABETES: Chronic | ICD-10-CM

## 2024-11-05 PROCEDURE — 99214 OFFICE O/P EST MOD 30 MIN: CPT | Performed by: STUDENT IN AN ORGANIZED HEALTH CARE EDUCATION/TRAINING PROGRAM

## 2024-11-05 PROCEDURE — 99396 PREV VISIT EST AGE 40-64: CPT | Performed by: STUDENT IN AN ORGANIZED HEALTH CARE EDUCATION/TRAINING PROGRAM

## 2024-11-05 PROCEDURE — 90471 IMMUNIZATION ADMIN: CPT | Performed by: STUDENT IN AN ORGANIZED HEALTH CARE EDUCATION/TRAINING PROGRAM

## 2024-11-05 PROCEDURE — 90656 IIV3 VACC NO PRSV 0.5 ML IM: CPT | Performed by: STUDENT IN AN ORGANIZED HEALTH CARE EDUCATION/TRAINING PROGRAM

## 2024-11-05 RX ORDER — ACETYLCYSTEINE 600 MG
600 CAPSULE ORAL DAILY
COMMUNITY

## 2024-11-05 RX ORDER — BIOTIN 5 MG
2000 TABLET ORAL DAILY
COMMUNITY

## 2024-11-05 NOTE — PROGRESS NOTES
"Chief Complaint  Brett Glasgow is a 39 y.o. male presenting for Annual Exam and Hyperlipidemia.     Grew up in [place], [place]. Living in [place] since . Both his parents  early  of natural causes. Patient is . Has son born . 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     Patient has a past medical history of prediabetes (), MDD, TABATHA, hyperlipidemia (), sigmoid diverticulosis and obesity.    History of Present Illness  Patient is here for annual physical and follow-up after labs.    He has been using Scali avery with subscription and since August he has lost about 30 pounds, counting calories and exercising 30-60 minutes every other day.  He started using this avery again July and has lost a total of about 40 pounds.  He is feeling well and happy with his weight loss, his goal is weight around 180 pounds.    He was noted with mildly decreased HDL on the blood work, significant downtrend on LDL.    He has no family history of colorectal cancer or polyps as far as he knows.  He did colonoscopy in  due to abdominal discomfort, no polyps were found.    He goes to the dentist once or twice yearly.    The following portions of the patient's history were reviewed and updated as appropriate: allergies, current medications, past family history, past medical history, past social history, past surgical history, and problem list.    Objective  /82 (BP Location: Left arm, Patient Position: Sitting, Cuff Size: Adult)   Pulse 65   Temp 98 °F (36.7 °C) (Infrared)   Ht 180 cm (70.87\")   Wt 101 kg (223 lb 6.4 oz)   SpO2 96%   BMI 31.28 kg/m²     Physical Exam  Vitals reviewed.   Constitutional:       Appearance: Normal appearance.   HENT:      Head: Normocephalic and atraumatic.      Right Ear: Tympanic membrane, ear canal and external ear normal. There is no impacted cerumen.      Left Ear: Tympanic membrane, ear canal and " external ear normal. There is no impacted cerumen.      Nose: Nose normal. No congestion.      Mouth/Throat:      Mouth: Mucous membranes are moist.      Pharynx: Oropharynx is clear.   Eyes:      Extraocular Movements: Extraocular movements intact.      Conjunctiva/sclera: Conjunctivae normal.   Cardiovascular:      Rate and Rhythm: Normal rate and regular rhythm.      Heart sounds: Normal heart sounds. No murmur heard.  Pulmonary:      Effort: Pulmonary effort is normal.      Breath sounds: Normal breath sounds.   Abdominal:      General: There is no distension.      Palpations: Abdomen is soft. There is no mass.      Tenderness: There is no abdominal tenderness.   Musculoskeletal:      Cervical back: Neck supple. No tenderness.      Right lower leg: No edema.      Left lower leg: No edema.   Lymphadenopathy:      Cervical: No cervical adenopathy.   Skin:     General: Skin is warm and dry.   Neurological:      Mental Status: He is alert and oriented to person, place, and time. Mental status is at baseline.   Psychiatric:         Behavior: Behavior normal.         Thought Content: Thought content normal.         Assessment/Plan   1. Well adult exam  Counseled on recommendations for annual flu shot and COVID booster.  Counseled on recommendations for Tdap vaccine for tetanus and whooping cough every 10 years, is due in   2031.  Counseled on recommendations for regular dental visits, at least annually, ideally twice yearly.  Counseled on recommendations for moderate intensity exercise 2.5 hours weekly, patient exceeds these recommendations currently.  Counseled on recommendations for colorectal cancer screening colonoscopy starting at age 45.  She has no family history and his most recent colonoscopy in 2021 would qualify, so this next colonoscopy would be 2031.    2. Hypercholesterolemia with LDL greater than 190 mg/dL  HDL on the low side at 37, LDL significantly improved at 51.  Based on his previous high LDL it  is recommended to be on high intensity statin, which includes atorvastatin/Lipitor 40 mg or higher.  His ratio is fairly good, so I recommend to continue on current dose of atorvastatin.  Will recheck levels before next visit in 6 months.  Likely his levels will continue to improve with his ongoing weight loss and lifestyle adjustment.  - Lipid Panel; Future  - Comprehensive Metabolic Panel; Future    3. Prediabetes  Hemoglobin A1C   Date Value Ref Range Status   11/01/2024 5.50 4.80 - 5.60 % Final   08/01/2024 5.60 4.80 - 5.60 % Final   03/15/2024 5.70 (H) 4.80 - 5.60 % Final   Improve glycemic control.  Continue on metformin 500 mg daily for now.  Will recheck A1c again in 6 months  - Hemoglobin A1c; Future    4. Need for influenza vaccination  Administered today  - Fluzone >6mos    5. Obesity, Class I, BMI 30-34.9       Return in about 6 months (around 5/5/2025) for Recheck.    Future Appointments         Provider Department Center    5/6/2025 9:00 AM Fito Feliz MD [facility] [place]              Fito Feliz MD  Family Medicine  11/05/2024

## 2024-12-08 DIAGNOSIS — E78.00 HYPERCHOLESTEROLEMIA WITH LDL GREATER THAN 190 MG/DL: Chronic | ICD-10-CM

## 2024-12-09 RX ORDER — ATORVASTATIN CALCIUM 40 MG/1
40 TABLET, FILM COATED ORAL DAILY
Qty: 30 TABLET | Refills: 5 | Status: SHIPPED | OUTPATIENT
Start: 2024-12-09

## 2025-02-18 ENCOUNTER — OFFICE VISIT (OUTPATIENT)
Dept: INTERNAL MEDICINE | Facility: CLINIC | Age: 40
End: 2025-02-18
Payer: COMMERCIAL

## 2025-02-18 VITALS
WEIGHT: 198 LBS | TEMPERATURE: 98 F | SYSTOLIC BLOOD PRESSURE: 130 MMHG | HEIGHT: 71 IN | OXYGEN SATURATION: 99 % | BODY MASS INDEX: 27.72 KG/M2 | HEART RATE: 60 BPM | DIASTOLIC BLOOD PRESSURE: 70 MMHG

## 2025-02-18 DIAGNOSIS — E66.3 OVERWEIGHT (BMI 25.0-29.9): ICD-10-CM

## 2025-02-18 DIAGNOSIS — R73.03 PREDIABETES: Chronic | ICD-10-CM

## 2025-02-18 DIAGNOSIS — R22.0 NODULE OF SKIN OF HEAD: Primary | ICD-10-CM

## 2025-02-18 PROCEDURE — 99213 OFFICE O/P EST LOW 20 MIN: CPT | Performed by: STUDENT IN AN ORGANIZED HEALTH CARE EDUCATION/TRAINING PROGRAM

## 2025-02-18 NOTE — PROGRESS NOTES
"Chief Complaint  Brett Glasgow is a 39 y.o. male presenting for Lumps on head.     Grew up in [place], [place]. Living in [place] since . Both his parents  early  of natural causes. Patient is . Has son born . 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     Patient has a past medical history of prediabetes (), MDD, TABATHA, hyperlipidemia (), sigmoid diverticulosis and obesity.    History of Present Illness  Patient is here for evaluation after he has noticed some lumps of the back of his head.  Present for more than 1 year on the left side, also recently noticed on the right side.  Pea-sized, fairly hard, nontender.  No fever or chills, no night sweats.  He is losing weight intentionally after significant lifestyle changes.    The following portions of the patient's history were reviewed and updated as appropriate: allergies, current medications, past family history, past medical history, past social history, past surgical history, and problem list.    Objective  /70 (BP Location: Left arm, Patient Position: Sitting, Cuff Size: Adult)   Pulse 60   Temp 98 °F (36.7 °C) (Infrared)   Ht 180 cm (70.87\")   Wt 89.8 kg (198 lb)   SpO2 99%   BMI 27.72 kg/m²     Physical Exam  Vitals reviewed.   Constitutional:       Appearance: Normal appearance.   HENT:      Head: Normocephalic and atraumatic.        Nose: Nose normal. No congestion.      Mouth/Throat:      Mouth: Mucous membranes are moist.   Eyes:      Extraocular Movements: Extraocular movements intact.      Conjunctiva/sclera: Conjunctivae normal.   Cardiovascular:      Rate and Rhythm: Normal rate and regular rhythm.      Heart sounds: Normal heart sounds. No murmur heard.  Pulmonary:      Effort: Pulmonary effort is normal. No respiratory distress.      Breath sounds: Normal breath sounds. No wheezing.   Musculoskeletal:      Cervical back: Neck " supple. No tenderness.   Lymphadenopathy:      Cervical: No cervical adenopathy.   Skin:     General: Skin is warm and dry.   Neurological:      Mental Status: He is alert and oriented to person, place, and time. Mental status is at baseline.   Psychiatric:         Behavior: Behavior normal.         Thought Content: Thought content normal.         Assessment/Plan   1. Nodule of skin of head  Benign skin nodules located in deeper dermis.  Would not need any further workup or removal at this point.  If any of these nodules do increase rapidly I would consider further workup.    2. Prediabetes  Hemoglobin A1C   Date Value Ref Range Status   11/01/2024 5.50 4.80 - 5.60 % Final   08/01/2024 5.60 4.80 - 5.60 % Final   03/15/2024 5.70 (H) 4.80 - 5.60 % Final   Good glycemic control.  He is still on metformin 500 mg extended release.  This usually will not cause low blood sugars, but if he continues to lose weight it might be reasonable to discontinue.  Will recheck blood work as planned before next visit    3. Overweight (BMI 25.0-29.9)  Wt Readings from Last 3 Encounters:   02/18/25 89.8 kg (198 lb)   11/05/24 101 kg (223 lb 6.4 oz)   08/06/24 115 kg (252 lb 9.6 oz)   Significant weight loss with lifestyle changes.  Encouraged continued exercise and diet for further weight loss.      Return if symptoms worsen or fail to improve, for Next scheduled follow up.    Future Appointments         Provider Department Center    5/6/2025 9:00 AM Fito Feliz MD [facility] [place]              Fito Feliz MD  Family Medicine  02/18/2025

## 2025-05-02 ENCOUNTER — LAB (OUTPATIENT)
Dept: LAB | Facility: HOSPITAL | Age: 40
End: 2025-05-02
Payer: COMMERCIAL

## 2025-05-02 DIAGNOSIS — E78.00 HYPERCHOLESTEROLEMIA WITH LDL GREATER THAN 190 MG/DL: Chronic | ICD-10-CM

## 2025-05-02 DIAGNOSIS — R73.03 PREDIABETES: Chronic | ICD-10-CM

## 2025-05-02 LAB
ALBUMIN SERPL-MCNC: 4.5 G/DL (ref 3.5–5.2)
ALBUMIN/GLOB SERPL: 1.8 G/DL
ALP SERPL-CCNC: 72 U/L (ref 39–117)
ALT SERPL W P-5'-P-CCNC: 24 U/L (ref 1–41)
ANION GAP SERPL CALCULATED.3IONS-SCNC: 8.1 MMOL/L (ref 5–15)
AST SERPL-CCNC: 21 U/L (ref 1–40)
BILIRUB SERPL-MCNC: 0.6 MG/DL (ref 0–1.2)
BUN SERPL-MCNC: 15 MG/DL (ref 6–20)
BUN/CREAT SERPL: 15.6 (ref 7–25)
CALCIUM SPEC-SCNC: 9.6 MG/DL (ref 8.6–10.5)
CHLORIDE SERPL-SCNC: 104 MMOL/L (ref 98–107)
CHOLEST SERPL-MCNC: 120 MG/DL (ref 0–200)
CO2 SERPL-SCNC: 25.9 MMOL/L (ref 22–29)
CREAT SERPL-MCNC: 0.96 MG/DL (ref 0.76–1.27)
EGFRCR SERPLBLD CKD-EPI 2021: 101.8 ML/MIN/1.73
GLOBULIN UR ELPH-MCNC: 2.5 GM/DL
GLUCOSE SERPL-MCNC: 94 MG/DL (ref 65–99)
HBA1C MFR BLD: 5.7 % (ref 4.8–5.6)
HDLC SERPL-MCNC: 47 MG/DL (ref 40–60)
LDLC SERPL CALC-MCNC: 61 MG/DL (ref 0–100)
LDLC/HDLC SERPL: 1.33 {RATIO}
POTASSIUM SERPL-SCNC: 4.7 MMOL/L (ref 3.5–5.2)
PROT SERPL-MCNC: 7 G/DL (ref 6–8.5)
SODIUM SERPL-SCNC: 138 MMOL/L (ref 136–145)
TRIGL SERPL-MCNC: 53 MG/DL (ref 0–150)
VLDLC SERPL-MCNC: 12 MG/DL (ref 5–40)

## 2025-05-02 PROCEDURE — 80061 LIPID PANEL: CPT

## 2025-05-02 PROCEDURE — 83036 HEMOGLOBIN GLYCOSYLATED A1C: CPT

## 2025-05-02 PROCEDURE — 80053 COMPREHEN METABOLIC PANEL: CPT

## 2025-05-06 ENCOUNTER — OFFICE VISIT (OUTPATIENT)
Dept: INTERNAL MEDICINE | Facility: CLINIC | Age: 40
End: 2025-05-06
Payer: COMMERCIAL

## 2025-05-06 VITALS
BODY MASS INDEX: 24.64 KG/M2 | WEIGHT: 176 LBS | OXYGEN SATURATION: 98 % | HEART RATE: 70 BPM | HEIGHT: 71 IN | DIASTOLIC BLOOD PRESSURE: 80 MMHG | SYSTOLIC BLOOD PRESSURE: 118 MMHG | TEMPERATURE: 98 F

## 2025-05-06 DIAGNOSIS — F33.41 RECURRENT MAJOR DEPRESSIVE DISORDER, IN PARTIAL REMISSION: Chronic | ICD-10-CM

## 2025-05-06 DIAGNOSIS — E78.00 HYPERCHOLESTEROLEMIA WITH LDL GREATER THAN 190 MG/DL: Primary | Chronic | ICD-10-CM

## 2025-05-06 DIAGNOSIS — R73.03 PREDIABETES: Chronic | ICD-10-CM

## 2025-05-06 PROBLEM — E66.3 OVERWEIGHT (BMI 25.0-29.9): Status: RESOLVED | Noted: 2021-10-12 | Resolved: 2025-05-06

## 2025-05-06 PROCEDURE — 99214 OFFICE O/P EST MOD 30 MIN: CPT | Performed by: STUDENT IN AN ORGANIZED HEALTH CARE EDUCATION/TRAINING PROGRAM

## 2025-05-06 RX ORDER — METFORMIN HYDROCHLORIDE 500 MG/1
500 TABLET, EXTENDED RELEASE ORAL
Qty: 30 TABLET | Refills: 5 | Status: SHIPPED | OUTPATIENT
Start: 2025-05-06 | End: 2025-05-08

## 2025-05-06 RX ORDER — ATORVASTATIN CALCIUM 40 MG/1
40 TABLET, FILM COATED ORAL DAILY
Qty: 30 TABLET | Refills: 5 | Status: SHIPPED | OUTPATIENT
Start: 2025-05-06 | End: 2025-05-08

## 2025-05-06 NOTE — PROGRESS NOTES
"Chief Complaint  Brett Glasgow is a 39 y.o. male presenting for Prediabetes (6mo f/u).     Grew up in [place], [place]. Living in [place] since . Both his parents  early  of natural causes. Patient is . Has son born . 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     Patient has a past medical history of prediabetes (), MDD, TABATHA, hyperlipidemia (), sigmoid diverticulosis and obesity.    History of Present Illness  Patient is here for follow-up.    He is overall doing well.  He continues to exercise and watch his diet, and he has now reached his goal, but is considering losing another few pounds.    The following portions of the patient's history were reviewed and updated as appropriate: allergies, current medications, past family history, past medical history, past social history, past surgical history, and problem list.    Objective  /80 (BP Location: Left arm, Patient Position: Sitting, Cuff Size: Adult)   Pulse 70   Temp 98 °F (36.7 °C) (Infrared)   Ht 180 cm (70.87\")   Wt 79.8 kg (176 lb)   SpO2 98%   BMI 24.64 kg/m²     Physical Exam  Constitutional:       Appearance: Normal appearance.   HENT:      Head: Normocephalic and atraumatic.   Eyes:      Extraocular Movements: Extraocular movements intact.      Conjunctiva/sclera: Conjunctivae normal.   Pulmonary:      Effort: Pulmonary effort is normal. No respiratory distress.   Musculoskeletal:      Cervical back: Neck supple.   Skin:     General: Skin is warm and dry.   Neurological:      Mental Status: He is alert and oriented to person, place, and time. Mental status is at baseline.   Psychiatric:         Thought Content: Thought content normal.         Assessment/Plan   1. Hypercholesterolemia with LDL greater than 190 mg/dL  Patient has significant family history, and with his high LDL it is reasonable to continue on atorvastatin 40 mg.  " Patient in agreement.  His levels look good  - atorvastatin (LIPITOR) 40 MG tablet; Take 1 tablet by mouth Daily.  Dispense: 30 tablet; Refill: 5    2. Prediabetes  Hemoglobin A1C   Date Value Ref Range Status   05/02/2025 5.70 (H) 4.80 - 5.60 % Final   11/01/2024 5.50 4.80 - 5.60 % Final   08/01/2024 5.60 4.80 - 5.60 % Final   Overall stable, slightly up from last check.  Recommend continuing on metformin.  - metFORMIN ER (GLUCOPHAGE-XR) 500 MG 24 hr tablet; Take 1 tablet by mouth Daily With Breakfast.  Dispense: 30 tablet; Refill: 5    Wt Readings from Last 3 Encounters:   05/06/25 79.8 kg (176 lb)   02/18/25 89.8 kg (198 lb)   11/05/24 101 kg (223 lb 6.4 oz)   Impressive weight loss by lifestyle changes.  Encouraged continued healthy lifestyle.    3. Recurrent major depressive disorder, in partial remission  Stable and doing well, not on medications..  Smiling during the visit    BMI is within normal parameters. No other follow-up for BMI required.    Return in about 6 months (around 11/6/2025), or if symptoms worsen or fail to improve, for Recheck.    Future Appointments         Provider Department Center    11/10/2025 8:30 AM (Arrive by 8:15 AM) Fito Feliz MD [facility] [place]              Fito Feliz MD  Family Medicine  05/06/2025

## 2025-05-08 DIAGNOSIS — E78.00 HYPERCHOLESTEROLEMIA WITH LDL GREATER THAN 190 MG/DL: Chronic | ICD-10-CM

## 2025-05-08 DIAGNOSIS — R73.03 PREDIABETES: Chronic | ICD-10-CM

## 2025-05-08 RX ORDER — METFORMIN HYDROCHLORIDE 500 MG/1
500 TABLET, EXTENDED RELEASE ORAL
Qty: 30 TABLET | Refills: 5 | Status: SHIPPED | OUTPATIENT
Start: 2025-05-08

## 2025-05-08 RX ORDER — ATORVASTATIN CALCIUM 40 MG/1
40 TABLET, FILM COATED ORAL DAILY
Qty: 30 TABLET | Refills: 5 | Status: SHIPPED | OUTPATIENT
Start: 2025-05-08